# Patient Record
Sex: FEMALE | Race: WHITE
[De-identification: names, ages, dates, MRNs, and addresses within clinical notes are randomized per-mention and may not be internally consistent; named-entity substitution may affect disease eponyms.]

---

## 2017-09-13 ENCOUNTER — TRANSCRIPTION ENCOUNTER (OUTPATIENT)
Age: 42
End: 2017-09-13

## 2020-11-05 ENCOUNTER — APPOINTMENT (OUTPATIENT)
Dept: CARDIOLOGY | Facility: CLINIC | Age: 45
End: 2020-11-05

## 2020-11-17 ENCOUNTER — APPOINTMENT (OUTPATIENT)
Dept: OBGYN | Facility: CLINIC | Age: 45
End: 2020-11-17
Payer: COMMERCIAL

## 2020-11-17 VITALS
DIASTOLIC BLOOD PRESSURE: 89 MMHG | BODY MASS INDEX: 23.21 KG/M2 | WEIGHT: 131 LBS | HEART RATE: 102 BPM | HEIGHT: 63 IN | SYSTOLIC BLOOD PRESSURE: 146 MMHG

## 2020-11-17 DIAGNOSIS — N89.8 OTHER SPECIFIED NONINFLAMMATORY DISORDERS OF VAGINA: ICD-10-CM

## 2020-11-17 DIAGNOSIS — R30.0 DYSURIA: ICD-10-CM

## 2020-11-17 DIAGNOSIS — D25.9 LEIOMYOMA OF UTERUS, UNSPECIFIED: ICD-10-CM

## 2020-11-17 PROCEDURE — 99386 PREV VISIT NEW AGE 40-64: CPT

## 2020-11-17 PROCEDURE — 99214 OFFICE O/P EST MOD 30 MIN: CPT | Mod: 25

## 2020-11-17 NOTE — HISTORY OF PRESENT ILLNESS
[FreeTextEntry1] : 45-year-old white female para one presents as a new patient. She is reporting heavy menses. Has a history of having had some fibroids removed vaginally. She denies feeling lightheaded or dizzy. She says she bleed for 7 straight days.\par \par She is medical history of hypertension as well as depression/anxiety. Surgical history of left breast biopsy that was benign, appendectomy as well as a vaginal myomectomy. OB history significant for one vaginal delivery and 2 terminations. She is  but sexually active and monogamous; her partner has had a vasectomy. She denies tobacco drug use but drinks occasionally.\par \par Patient reports having had a recent mammogram. She is also reporting some dysuria today as well as a small vaginal discharge.

## 2020-11-17 NOTE — DISCUSSION/SUMMARY
[FreeTextEntry1] : Pap smear is performed. I did not detect any abnormal discharge but yeast and bacterial cultures will perform. Secondary to her dysuria a urine culture was sent as well.\par \par Regarding her heavy menses and enlarged fibroid uterus we had a long discussion. I am obtaining a CBC and a TSH. Patient will return for endometrial biopsy with office hysteroscopy and sonogram. I discussed treatment of large fibroids to be more surgical; patient says she would like to avoid hysterectomy as best as she can.

## 2020-11-19 LAB
BACTERIA UR CULT: NORMAL
HPV HIGH+LOW RISK DNA PNL CVX: NOT DETECTED

## 2020-11-21 LAB — BACTERIA GENITAL AEROBE CULT: NORMAL

## 2020-11-24 LAB — CYTOLOGY CVX/VAG DOC THIN PREP: NORMAL

## 2020-12-15 ENCOUNTER — APPOINTMENT (OUTPATIENT)
Dept: OBGYN | Facility: CLINIC | Age: 45
End: 2020-12-15
Payer: COMMERCIAL

## 2020-12-15 ENCOUNTER — ASOB RESULT (OUTPATIENT)
Age: 45
End: 2020-12-15

## 2020-12-15 VITALS
DIASTOLIC BLOOD PRESSURE: 80 MMHG | HEIGHT: 63 IN | SYSTOLIC BLOOD PRESSURE: 130 MMHG | BODY MASS INDEX: 23.04 KG/M2 | WEIGHT: 130 LBS

## 2020-12-15 PROCEDURE — 99072 ADDL SUPL MATRL&STAF TM PHE: CPT

## 2020-12-15 PROCEDURE — 58558Z: CUSTOM

## 2020-12-15 PROCEDURE — 76856 US EXAM PELVIC COMPLETE: CPT | Mod: 59

## 2020-12-15 PROCEDURE — 76830 TRANSVAGINAL US NON-OB: CPT

## 2020-12-15 NOTE — DISCUSSION/SUMMARY
[FreeTextEntry1] : I discussed the findings of the sonogram an office hysteroscopy with the patient. I discussed that her uterus is quite enlarged with multiple myomas and really the best treatment option to consider would be a hysterectomy. Patient is adamantly opposed to considering a hysterectomy. We discussed considering him with hydrothermal ablation with insertion of Mirena IUD. I discussed high risk of failure given multiple myomas. Patient however wishes to try. She is also anemic and I advised her to take iron; her H&H is 10 and 30.

## 2020-12-15 NOTE — PROCEDURE
[Endometrial Biopsy] : Endometrial biopsy [Tenaculum] : Tenaculum [Required Dilation] : required dilation [Sounded to ___ cm] : sounded to [unfilled] ~Ucm [Moderate] : moderate [Tolerated Well] : Patient tolerated the procedure well [Hysteroscopy] : Hysteroscopy [Abnormal uterine bleeding] : abnormal uterine bleeding [Risks] : risks [Benefits] : benefits [Infection] : infection [Bleeding] : bleeding [Pretreatment with misoprostol] : pretreatment with misoprostol [Sent to Pathology] : specimen was placed in buffered formalin and sent for pathology [de-identified] : no intrauterine myomas or polyps noted

## 2020-12-15 NOTE — HISTORY OF PRESENT ILLNESS
[FreeTextEntry1] : 45-year-old white female para one here for followup visit for menometrorrhagia. She presents with ultrasound and office hysteroscopy and endometrial biopsy and to discuss treatment options.\par \par Ultrasound reveals a 10. 6 x 7.8 cm uterus with multiple myomas the largest of which is a 5.5 cm intramural myoma. Endometrial lining is 9.1 mm with no polyps and both adnexa normal.

## 2020-12-22 LAB — CORE LAB BIOPSY: NORMAL

## 2021-01-13 ENCOUNTER — APPOINTMENT (OUTPATIENT)
Dept: CARDIOLOGY | Facility: CLINIC | Age: 46
End: 2021-01-13
Payer: COMMERCIAL

## 2021-01-13 ENCOUNTER — NON-APPOINTMENT (OUTPATIENT)
Age: 46
End: 2021-01-13

## 2021-01-13 VITALS
BODY MASS INDEX: 22.32 KG/M2 | OXYGEN SATURATION: 100 % | HEIGHT: 63 IN | RESPIRATION RATE: 16 BRPM | SYSTOLIC BLOOD PRESSURE: 157 MMHG | HEART RATE: 87 BPM | TEMPERATURE: 97.1 F | WEIGHT: 126 LBS | DIASTOLIC BLOOD PRESSURE: 114 MMHG

## 2021-01-13 DIAGNOSIS — R42 DIZZINESS AND GIDDINESS: ICD-10-CM

## 2021-01-13 DIAGNOSIS — R06.02 SHORTNESS OF BREATH: ICD-10-CM

## 2021-01-13 DIAGNOSIS — K21.9 GASTRO-ESOPHAGEAL REFLUX DISEASE W/OUT ESOPHAGITIS: ICD-10-CM

## 2021-01-13 DIAGNOSIS — Z87.891 PERSONAL HISTORY OF NICOTINE DEPENDENCE: ICD-10-CM

## 2021-01-13 DIAGNOSIS — Z72.89 OTHER PROBLEMS RELATED TO LIFESTYLE: ICD-10-CM

## 2021-01-13 DIAGNOSIS — R00.2 PALPITATIONS: ICD-10-CM

## 2021-01-13 DIAGNOSIS — Z82.49 FAMILY HISTORY OF ISCHEMIC HEART DISEASE AND OTHER DISEASES OF THE CIRCULATORY SYSTEM: ICD-10-CM

## 2021-01-13 DIAGNOSIS — I10 ESSENTIAL (PRIMARY) HYPERTENSION: ICD-10-CM

## 2021-01-13 PROCEDURE — 99204 OFFICE O/P NEW MOD 45 MIN: CPT

## 2021-01-13 PROCEDURE — 93000 ELECTROCARDIOGRAM COMPLETE: CPT

## 2021-01-13 PROCEDURE — 99072 ADDL SUPL MATRL&STAF TM PHE: CPT

## 2021-01-13 RX ORDER — OMEPRAZOLE 40 MG/1
40 CAPSULE, DELAYED RELEASE ORAL
Refills: 0 | Status: ACTIVE | COMMUNITY

## 2021-01-13 RX ORDER — LISINOPRIL 20 MG/1
20 TABLET ORAL DAILY
Refills: 0 | Status: ACTIVE | COMMUNITY

## 2021-01-13 RX ORDER — VENLAFAXINE HCL 37.5 MG
37.5 TABLET ORAL
Refills: 0 | Status: ACTIVE | COMMUNITY

## 2021-01-13 RX ORDER — HYDROCHLOROTHIAZIDE 12.5 MG/1
12.5 CAPSULE ORAL DAILY
Refills: 0 | Status: ACTIVE | COMMUNITY

## 2021-01-13 RX ORDER — MISOPROSTOL 200 UG/1
200 TABLET ORAL
Qty: 2 | Refills: 0 | Status: DISCONTINUED | COMMUNITY
Start: 2020-11-17 | End: 2021-01-13

## 2021-01-13 RX ORDER — BUPRENORPHINE HCL/NALOXONE HCL 8 MG-2 MG
8-2 TABLET, SUBLINGUAL SUBLINGUAL
Refills: 0 | Status: ACTIVE | COMMUNITY

## 2021-01-13 NOTE — DISCUSSION/SUMMARY
[FreeTextEntry1] : Patient is a 44yo F former smoker, anxiety, HTN, anemia from fibroids, family h/o CAD here for cardiac evaluation of palpitations, SOB and CP. Symptoms likely anxiety/panic related but will need cardiac eval. Regarding up coming surgery, may proceed from CV standpoint and arrange testing after. \par \par 1. Patient is at low cardiovascular risk for low risk surgery (D&C and IUD, hysterectomy as well if deemed necessary) \par 2. Will arrange RAFAEL, echo and holter to eval palps/CP/SOB\par 3. Consider psychiatry evaluation\par 4. Regular follow up with Dr. Jordan

## 2021-01-13 NOTE — PHYSICAL EXAM
[General Appearance - Well Developed] : well developed [General Appearance - Well Nourished] : well nourished [Normal Conjunctiva] : the conjunctiva exhibited no abnormalities [Normal Oropharynx] : normal oropharynx [Normal Jugular Venous V Waves Present] : normal jugular venous V waves present [Heart Rate And Rhythm] : heart rate and rhythm were normal [Heart Sounds] : normal S1 and S2 [Murmurs] : no murmurs present [Edema] : no peripheral edema present [] : no respiratory distress [Respiration, Rhythm And Depth] : normal respiratory rhythm and effort [Auscultation Breath Sounds / Voice Sounds] : lungs were clear to auscultation bilaterally [Abdomen Soft] : soft [Abdomen Tenderness] : non-tender [Abnormal Walk] : normal gait [Nail Clubbing] : no clubbing of the fingernails [Cyanosis, Localized] : no localized cyanosis [Skin Color & Pigmentation] : normal skin color and pigmentation [Oriented To Time, Place, And Person] : oriented to person, place, and time [Affect] : the affect was normal

## 2021-01-13 NOTE — HISTORY OF PRESENT ILLNESS
[FreeTextEntry1] : Patient is a 46yo F former smoker, anxiety, HTN, anemia here for cardiac evaluation of palpitations. HAs struggled with social anxiety for many years. HAd episode when felt nearly passed out about 6 months ago. Felt shakes, CP prior. Went to ED and work up negative. States diagnosed with panic attack.  Since that time, has remained active and professional snowboarder. However when goes up and down stairs will get SOB. Gets nauseated as well. When would try to work out, felt nauseated and vomited trying to work out. Has hard time sleeping at night as well. NO PND/orthopnea/edema. Palps occur daily. BP at home when she checks runs 130s/80s. \par \par States anemia is due to fibroids and significant bleeding with menstrual cycle. Planned for procedure next month, D&C and IUD. \par \par Lives with domestic partner, has 23yo daughter. WOrks in medical billing. \par \par ROS: GI negative, all others negative

## 2021-01-20 ENCOUNTER — OUTPATIENT (OUTPATIENT)
Dept: OUTPATIENT SERVICES | Facility: HOSPITAL | Age: 46
LOS: 1 days | End: 2021-01-20
Payer: COMMERCIAL

## 2021-01-20 DIAGNOSIS — Z01.818 ENCOUNTER FOR OTHER PREPROCEDURAL EXAMINATION: ICD-10-CM

## 2021-01-20 LAB
ANION GAP SERPL CALC-SCNC: 17 MMOL/L — SIGNIFICANT CHANGE UP (ref 5–17)
APPEARANCE UR: CLEAR — SIGNIFICANT CHANGE UP
BACTERIA # UR AUTO: ABNORMAL
BASOPHILS # BLD AUTO: 0.14 K/UL — SIGNIFICANT CHANGE UP (ref 0–0.2)
BASOPHILS NFR BLD AUTO: 1.8 % — SIGNIFICANT CHANGE UP (ref 0–2)
BILIRUB UR-MCNC: NEGATIVE — SIGNIFICANT CHANGE UP
BLD GP AB SCN SERPL QL: SIGNIFICANT CHANGE UP
BUN SERPL-MCNC: 10 MG/DL — SIGNIFICANT CHANGE UP (ref 8–20)
CALCIUM SERPL-MCNC: 9.6 MG/DL — SIGNIFICANT CHANGE UP (ref 8.6–10.2)
CHLORIDE SERPL-SCNC: 96 MMOL/L — LOW (ref 98–107)
CO2 SERPL-SCNC: 23 MMOL/L — SIGNIFICANT CHANGE UP (ref 22–29)
COLOR SPEC: YELLOW — SIGNIFICANT CHANGE UP
CREAT SERPL-MCNC: 0.51 MG/DL — SIGNIFICANT CHANGE UP (ref 0.5–1.3)
DIFF PNL FLD: NEGATIVE — SIGNIFICANT CHANGE UP
EOSINOPHIL # BLD AUTO: 0.26 K/UL — SIGNIFICANT CHANGE UP (ref 0–0.5)
EOSINOPHIL NFR BLD AUTO: 3.4 % — SIGNIFICANT CHANGE UP (ref 0–6)
EPI CELLS # UR: SIGNIFICANT CHANGE UP
GLUCOSE SERPL-MCNC: 90 MG/DL — SIGNIFICANT CHANGE UP (ref 70–99)
GLUCOSE UR QL: NEGATIVE MG/DL — SIGNIFICANT CHANGE UP
HCG SERPL-ACNC: <4 MIU/ML — SIGNIFICANT CHANGE UP
HCT VFR BLD CALC: 37.3 % — SIGNIFICANT CHANGE UP (ref 34.5–45)
HGB BLD-MCNC: 12.1 G/DL — SIGNIFICANT CHANGE UP (ref 11.5–15.5)
IMM GRANULOCYTES NFR BLD AUTO: 0.4 % — SIGNIFICANT CHANGE UP (ref 0–1.5)
KETONES UR-MCNC: ABNORMAL
LEUKOCYTE ESTERASE UR-ACNC: ABNORMAL
LYMPHOCYTES # BLD AUTO: 1.97 K/UL — SIGNIFICANT CHANGE UP (ref 1–3.3)
LYMPHOCYTES # BLD AUTO: 25.5 % — SIGNIFICANT CHANGE UP (ref 13–44)
MCHC RBC-ENTMCNC: 31.3 PG — SIGNIFICANT CHANGE UP (ref 27–34)
MCHC RBC-ENTMCNC: 32.4 GM/DL — SIGNIFICANT CHANGE UP (ref 32–36)
MCV RBC AUTO: 96.6 FL — SIGNIFICANT CHANGE UP (ref 80–100)
MONOCYTES # BLD AUTO: 0.78 K/UL — SIGNIFICANT CHANGE UP (ref 0–0.9)
MONOCYTES NFR BLD AUTO: 10.1 % — SIGNIFICANT CHANGE UP (ref 2–14)
NEUTROPHILS # BLD AUTO: 4.56 K/UL — SIGNIFICANT CHANGE UP (ref 1.8–7.4)
NEUTROPHILS NFR BLD AUTO: 58.8 % — SIGNIFICANT CHANGE UP (ref 43–77)
NITRITE UR-MCNC: NEGATIVE — SIGNIFICANT CHANGE UP
PH UR: 6 — SIGNIFICANT CHANGE UP (ref 5–8)
PLATELET # BLD AUTO: 332 K/UL — SIGNIFICANT CHANGE UP (ref 150–400)
POTASSIUM SERPL-MCNC: 4.5 MMOL/L — SIGNIFICANT CHANGE UP (ref 3.5–5.3)
POTASSIUM SERPL-SCNC: 4.5 MMOL/L — SIGNIFICANT CHANGE UP (ref 3.5–5.3)
PROT UR-MCNC: 15 MG/DL
RBC # BLD: 3.86 M/UL — SIGNIFICANT CHANGE UP (ref 3.8–5.2)
RBC # FLD: 15.7 % — HIGH (ref 10.3–14.5)
RBC CASTS # UR COMP ASSIST: SIGNIFICANT CHANGE UP /HPF (ref 0–4)
SODIUM SERPL-SCNC: 135 MMOL/L — SIGNIFICANT CHANGE UP (ref 135–145)
SP GR SPEC: 1.01 — SIGNIFICANT CHANGE UP (ref 1.01–1.02)
UROBILINOGEN FLD QL: NEGATIVE MG/DL — SIGNIFICANT CHANGE UP
WBC # BLD: 7.74 K/UL — SIGNIFICANT CHANGE UP (ref 3.8–10.5)
WBC # FLD AUTO: 7.74 K/UL — SIGNIFICANT CHANGE UP (ref 3.8–10.5)
WBC UR QL: SIGNIFICANT CHANGE UP

## 2021-01-20 PROCEDURE — 93010 ELECTROCARDIOGRAM REPORT: CPT

## 2021-01-20 PROCEDURE — 87086 URINE CULTURE/COLONY COUNT: CPT

## 2021-01-20 PROCEDURE — 93005 ELECTROCARDIOGRAM TRACING: CPT

## 2021-01-20 PROCEDURE — G0463: CPT

## 2021-01-22 LAB
CULTURE RESULTS: SIGNIFICANT CHANGE UP
SPECIMEN SOURCE: SIGNIFICANT CHANGE UP

## 2021-01-28 DIAGNOSIS — Z01.818 ENCOUNTER FOR OTHER PREPROCEDURAL EXAMINATION: ICD-10-CM

## 2021-01-29 ENCOUNTER — APPOINTMENT (OUTPATIENT)
Dept: DISASTER EMERGENCY | Facility: CLINIC | Age: 46
End: 2021-01-29

## 2021-01-30 LAB — SARS-COV-2 N GENE NPH QL NAA+PROBE: NOT DETECTED

## 2021-02-03 ENCOUNTER — RESULT REVIEW (OUTPATIENT)
Age: 46
End: 2021-02-03

## 2021-02-11 ENCOUNTER — APPOINTMENT (OUTPATIENT)
Dept: CARDIOLOGY | Facility: CLINIC | Age: 46
End: 2021-02-11

## 2021-02-23 ENCOUNTER — APPOINTMENT (OUTPATIENT)
Dept: OBGYN | Facility: CLINIC | Age: 46
End: 2021-02-23
Payer: COMMERCIAL

## 2021-02-23 VITALS
DIASTOLIC BLOOD PRESSURE: 91 MMHG | WEIGHT: 130 LBS | SYSTOLIC BLOOD PRESSURE: 138 MMHG | HEART RATE: 86 BPM | BODY MASS INDEX: 23.04 KG/M2 | HEIGHT: 63 IN

## 2021-02-23 DIAGNOSIS — N92.0 EXCESSIVE AND FREQUENT MENSTRUATION WITH REGULAR CYCLE: ICD-10-CM

## 2021-02-23 PROCEDURE — 99213 OFFICE O/P EST LOW 20 MIN: CPT

## 2021-02-23 PROCEDURE — 99072 ADDL SUPL MATRL&STAF TM PHE: CPT

## 2021-02-23 NOTE — HISTORY OF PRESENT ILLNESS
[FreeTextEntry1] : 45-year-old white female para one status post a D&C video hysteroscopy hydrothermal ablation and insertion of Mirena IUD in for follow up visit. Patient has menometrorrhagia with multiple myomas the largest of which is a 5.5 cm intramural myoma. Since the procedure patient reports persistent but scant vaginal bleeding.

## 2021-02-23 NOTE — PHYSICAL EXAM
[Labia Majora] : normal [Labia Minora] : normal [Scant] : There was scant vaginal bleeding [Normal] : normal [Enlarged ___ wks] : enlarged [unfilled] ~Uweeks [Uterine Adnexae] : normal

## 2021-02-23 NOTE — DISCUSSION/SUMMARY
[FreeTextEntry1] : Pathology was reviewed and noted to be benign. I discussed her bleeding could be secondary to just the hormonal effect a Mirena IUD. Patient was reassured; she will keep an eye on her bleeding. She understands that if she finds her bleeding intolerable we may consider a hysterectomy.

## 2021-03-03 ENCOUNTER — APPOINTMENT (OUTPATIENT)
Dept: CARDIOLOGY | Facility: CLINIC | Age: 46
End: 2021-03-03

## 2021-03-04 ENCOUNTER — APPOINTMENT (OUTPATIENT)
Dept: CARDIOLOGY | Facility: CLINIC | Age: 46
End: 2021-03-04
Payer: COMMERCIAL

## 2021-03-04 PROCEDURE — 93306 TTE W/DOPPLER COMPLETE: CPT

## 2021-03-04 PROCEDURE — 99072 ADDL SUPL MATRL&STAF TM PHE: CPT

## 2021-03-24 ENCOUNTER — APPOINTMENT (OUTPATIENT)
Dept: CARDIOLOGY | Facility: CLINIC | Age: 46
End: 2021-03-24

## 2021-03-29 ENCOUNTER — APPOINTMENT (OUTPATIENT)
Dept: CARDIOLOGY | Facility: CLINIC | Age: 46
End: 2021-03-29

## 2021-03-30 ENCOUNTER — APPOINTMENT (OUTPATIENT)
Dept: CARDIOLOGY | Facility: CLINIC | Age: 46
End: 2021-03-30

## 2021-05-13 ENCOUNTER — APPOINTMENT (OUTPATIENT)
Dept: CARDIOLOGY | Facility: CLINIC | Age: 46
End: 2021-05-13

## 2021-05-17 ENCOUNTER — APPOINTMENT (OUTPATIENT)
Dept: CARDIOLOGY | Facility: CLINIC | Age: 46
End: 2021-05-17

## 2021-12-13 ENCOUNTER — APPOINTMENT (OUTPATIENT)
Dept: OBGYN | Facility: CLINIC | Age: 46
End: 2021-12-13
Payer: COMMERCIAL

## 2021-12-13 VITALS
BODY MASS INDEX: 24.45 KG/M2 | HEIGHT: 63 IN | HEART RATE: 77 BPM | SYSTOLIC BLOOD PRESSURE: 151 MMHG | DIASTOLIC BLOOD PRESSURE: 89 MMHG | WEIGHT: 138 LBS

## 2021-12-13 DIAGNOSIS — N92.1 EXCESSIVE AND FREQUENT MENSTRUATION WITH IRREGULAR CYCLE: ICD-10-CM

## 2021-12-13 DIAGNOSIS — Z01.419 ENCOUNTER FOR GYNECOLOGICAL EXAMINATION (GENERAL) (ROUTINE) W/OUT ABNORMAL FINDINGS: ICD-10-CM

## 2021-12-13 PROCEDURE — 99213 OFFICE O/P EST LOW 20 MIN: CPT | Mod: 25

## 2021-12-13 PROCEDURE — 99396 PREV VISIT EST AGE 40-64: CPT

## 2021-12-13 NOTE — PHYSICAL EXAM

## 2021-12-15 LAB
BACTERIA UR CULT: NORMAL
HPV HIGH+LOW RISK DNA PNL CVX: NOT DETECTED

## 2021-12-20 DIAGNOSIS — B96.89 ACUTE VAGINITIS: ICD-10-CM

## 2021-12-20 DIAGNOSIS — N76.0 ACUTE VAGINITIS: ICD-10-CM

## 2021-12-20 RX ORDER — METRONIDAZOLE 7.5 MG/G
0.75 GEL VAGINAL
Qty: 1 | Refills: 1 | Status: ACTIVE | COMMUNITY
Start: 2021-12-20 | End: 1900-01-01

## 2021-12-21 LAB — CYTOLOGY CVX/VAG DOC THIN PREP: ABNORMAL

## 2022-01-05 ENCOUNTER — APPOINTMENT (OUTPATIENT)
Dept: OBGYN | Facility: CLINIC | Age: 47
End: 2022-01-05

## 2022-01-05 ENCOUNTER — APPOINTMENT (OUTPATIENT)
Dept: ANTEPARTUM | Facility: CLINIC | Age: 47
End: 2022-01-05

## 2022-01-05 NOTE — HISTORY OF PRESENT ILLNESS
[FreeTextEntry1] : 46-year-old white female para one presents for annual visit . She has a known fibroid uterus and had HTA/insertion of mirena in 2/321; pt reports significant improvement in bleeding but does report some spotting that she feels is daily.\par \par She is medical history of hypertension as well as depression/anxiety. Surgical history of left breast biopsy that was benign, appendectomy as well as a vaginal myomectomy. OB history significant for one vaginal delivery and 2 terminations. She is  but sexually active and monogamous; her partner has had a vasectomy. She denies tobacco drug use but drinks occasionally.\par \par Patient reports having had a recent mammogram. She is also reporting some dysuria today as well as a small vaginal discharge.

## 2022-01-05 NOTE — DISCUSSION/SUMMARY
[FreeTextEntry1] : Pap smear is performed. Urine culture was sent at her request.\par \par Regarding her menses and enlarged fibroid uterus we had a long discussion.As mentioned patient bleeds lightly but persistently. I discussed that if she found this intolerable we may consider hysterectomy. I discussed different forms of hysterectomy; laparoscopic versus open. Patient will return for repeat sonogram.

## 2022-05-11 ENCOUNTER — INPATIENT (INPATIENT)
Facility: HOSPITAL | Age: 47
LOS: 4 days | Discharge: HOME | DRG: 454 | End: 2022-05-16
Attending: ORTHOPAEDIC SURGERY | Admitting: ORTHOPAEDIC SURGERY
Payer: COMMERCIAL

## 2022-05-11 VITALS
HEART RATE: 77 BPM | SYSTOLIC BLOOD PRESSURE: 113 MMHG | OXYGEN SATURATION: 98 % | TEMPERATURE: 98 F | RESPIRATION RATE: 18 BRPM | DIASTOLIC BLOOD PRESSURE: 91 MMHG

## 2022-05-11 DIAGNOSIS — G95.20 UNSPECIFIED CORD COMPRESSION: ICD-10-CM

## 2022-05-11 LAB
ADD ON TEST-SPECIMEN IN LAB: SIGNIFICANT CHANGE UP
ALBUMIN SERPL ELPH-MCNC: 3.7 G/DL — SIGNIFICANT CHANGE UP (ref 3.3–5)
ALP SERPL-CCNC: 47 U/L — SIGNIFICANT CHANGE UP (ref 40–120)
ALT FLD-CCNC: 41 U/L — SIGNIFICANT CHANGE UP (ref 12–78)
ANION GAP SERPL CALC-SCNC: 8 MMOL/L — SIGNIFICANT CHANGE UP (ref 5–17)
APTT BLD: 31.3 SEC — SIGNIFICANT CHANGE UP (ref 27.5–35.5)
AST SERPL-CCNC: 48 U/L — HIGH (ref 15–37)
BASOPHILS # BLD AUTO: 0.08 K/UL — SIGNIFICANT CHANGE UP (ref 0–0.2)
BASOPHILS NFR BLD AUTO: 1.3 % — SIGNIFICANT CHANGE UP (ref 0–2)
BILIRUB SERPL-MCNC: 0.4 MG/DL — SIGNIFICANT CHANGE UP (ref 0.2–1.2)
BUN SERPL-MCNC: 6 MG/DL — LOW (ref 7–23)
CALCIUM SERPL-MCNC: 9.1 MG/DL — SIGNIFICANT CHANGE UP (ref 8.5–10.1)
CHLORIDE SERPL-SCNC: 101 MMOL/L — SIGNIFICANT CHANGE UP (ref 96–108)
CO2 SERPL-SCNC: 27 MMOL/L — SIGNIFICANT CHANGE UP (ref 22–31)
CREAT SERPL-MCNC: 0.57 MG/DL — SIGNIFICANT CHANGE UP (ref 0.5–1.3)
EGFR: 113 ML/MIN/1.73M2 — SIGNIFICANT CHANGE UP
EOSINOPHIL # BLD AUTO: 0.04 K/UL — SIGNIFICANT CHANGE UP (ref 0–0.5)
EOSINOPHIL NFR BLD AUTO: 0.6 % — SIGNIFICANT CHANGE UP (ref 0–6)
GLUCOSE SERPL-MCNC: 85 MG/DL — SIGNIFICANT CHANGE UP (ref 70–99)
HCT VFR BLD CALC: 34.8 % — SIGNIFICANT CHANGE UP (ref 34.5–45)
HGB BLD-MCNC: 11.4 G/DL — LOW (ref 11.5–15.5)
IMM GRANULOCYTES NFR BLD AUTO: 0.2 % — SIGNIFICANT CHANGE UP (ref 0–1.5)
INR BLD: 0.93 RATIO — SIGNIFICANT CHANGE UP (ref 0.88–1.16)
LYMPHOCYTES # BLD AUTO: 1.85 K/UL — SIGNIFICANT CHANGE UP (ref 1–3.3)
LYMPHOCYTES # BLD AUTO: 29.4 % — SIGNIFICANT CHANGE UP (ref 13–44)
MCHC RBC-ENTMCNC: 32.8 GM/DL — SIGNIFICANT CHANGE UP (ref 32–36)
MCHC RBC-ENTMCNC: 33.2 PG — SIGNIFICANT CHANGE UP (ref 27–34)
MCV RBC AUTO: 101.5 FL — HIGH (ref 80–100)
MONOCYTES # BLD AUTO: 0.51 K/UL — SIGNIFICANT CHANGE UP (ref 0–0.9)
MONOCYTES NFR BLD AUTO: 8.1 % — SIGNIFICANT CHANGE UP (ref 2–14)
NEUTROPHILS # BLD AUTO: 3.8 K/UL — SIGNIFICANT CHANGE UP (ref 1.8–7.4)
NEUTROPHILS NFR BLD AUTO: 60.4 % — SIGNIFICANT CHANGE UP (ref 43–77)
PLATELET # BLD AUTO: 307 K/UL — SIGNIFICANT CHANGE UP (ref 150–400)
POTASSIUM SERPL-MCNC: 3.8 MMOL/L — SIGNIFICANT CHANGE UP (ref 3.5–5.3)
POTASSIUM SERPL-SCNC: 3.8 MMOL/L — SIGNIFICANT CHANGE UP (ref 3.5–5.3)
PROT SERPL-MCNC: 7.6 GM/DL — SIGNIFICANT CHANGE UP (ref 6–8.3)
PROTHROM AB SERPL-ACNC: 10.8 SEC — SIGNIFICANT CHANGE UP (ref 10.5–13.4)
RBC # BLD: 3.43 M/UL — LOW (ref 3.8–5.2)
RBC # FLD: 14.6 % — HIGH (ref 10.3–14.5)
SARS-COV-2 RNA SPEC QL NAA+PROBE: SIGNIFICANT CHANGE UP
SODIUM SERPL-SCNC: 136 MMOL/L — SIGNIFICANT CHANGE UP (ref 135–145)
WBC # BLD: 6.29 K/UL — SIGNIFICANT CHANGE UP (ref 3.8–10.5)
WBC # FLD AUTO: 6.29 K/UL — SIGNIFICANT CHANGE UP (ref 3.8–10.5)

## 2022-05-11 PROCEDURE — 80048 BASIC METABOLIC PNL TOTAL CA: CPT

## 2022-05-11 PROCEDURE — 82962 GLUCOSE BLOOD TEST: CPT

## 2022-05-11 PROCEDURE — 81001 URINALYSIS AUTO W/SCOPE: CPT

## 2022-05-11 PROCEDURE — 93970 EXTREMITY STUDY: CPT | Mod: 26

## 2022-05-11 PROCEDURE — 97116 GAIT TRAINING THERAPY: CPT | Mod: GP

## 2022-05-11 PROCEDURE — 93010 ELECTROCARDIOGRAM REPORT: CPT

## 2022-05-11 PROCEDURE — 87040 BLOOD CULTURE FOR BACTERIA: CPT

## 2022-05-11 PROCEDURE — 72131 CT LUMBAR SPINE W/O DYE: CPT | Mod: 26,MA

## 2022-05-11 PROCEDURE — 93970 EXTREMITY STUDY: CPT

## 2022-05-11 PROCEDURE — 72158 MRI LUMBAR SPINE W/O & W/DYE: CPT | Mod: 26

## 2022-05-11 PROCEDURE — 97162 PT EVAL MOD COMPLEX 30 MIN: CPT | Mod: GP

## 2022-05-11 PROCEDURE — 86140 C-REACTIVE PROTEIN: CPT

## 2022-05-11 PROCEDURE — 87102 FUNGUS ISOLATION CULTURE: CPT

## 2022-05-11 PROCEDURE — 87070 CULTURE OTHR SPECIMN AEROBIC: CPT

## 2022-05-11 PROCEDURE — 85652 RBC SED RATE AUTOMATED: CPT

## 2022-05-11 PROCEDURE — 85027 COMPLETE CBC AUTOMATED: CPT

## 2022-05-11 PROCEDURE — 87086 URINE CULTURE/COLONY COUNT: CPT

## 2022-05-11 PROCEDURE — 86901 BLOOD TYPING SEROLOGIC RH(D): CPT

## 2022-05-11 PROCEDURE — 87075 CULTR BACTERIA EXCEPT BLOOD: CPT

## 2022-05-11 PROCEDURE — C1889: CPT

## 2022-05-11 PROCEDURE — 88304 TISSUE EXAM BY PATHOLOGIST: CPT

## 2022-05-11 PROCEDURE — 74176 CT ABD & PELVIS W/O CONTRAST: CPT

## 2022-05-11 PROCEDURE — 97530 THERAPEUTIC ACTIVITIES: CPT | Mod: GP

## 2022-05-11 PROCEDURE — 86891 AUTOLOGOUS BLOOD OP SALVAGE: CPT

## 2022-05-11 PROCEDURE — 93971 EXTREMITY STUDY: CPT | Mod: RT

## 2022-05-11 PROCEDURE — A9579: CPT

## 2022-05-11 PROCEDURE — 99221 1ST HOSP IP/OBS SF/LOW 40: CPT

## 2022-05-11 PROCEDURE — C1713: CPT

## 2022-05-11 PROCEDURE — 85730 THROMBOPLASTIN TIME PARTIAL: CPT

## 2022-05-11 PROCEDURE — 81025 URINE PREGNANCY TEST: CPT

## 2022-05-11 PROCEDURE — 99285 EMERGENCY DEPT VISIT HI MDM: CPT

## 2022-05-11 PROCEDURE — 86900 BLOOD TYPING SEROLOGIC ABO: CPT

## 2022-05-11 PROCEDURE — 71045 X-RAY EXAM CHEST 1 VIEW: CPT | Mod: 26

## 2022-05-11 PROCEDURE — 72158 MRI LUMBAR SPINE W/O & W/DYE: CPT | Mod: MA

## 2022-05-11 PROCEDURE — 82746 ASSAY OF FOLIC ACID SERUM: CPT

## 2022-05-11 PROCEDURE — 36415 COLL VENOUS BLD VENIPUNCTURE: CPT

## 2022-05-11 PROCEDURE — 76000 FLUOROSCOPY <1 HR PHYS/QHP: CPT

## 2022-05-11 PROCEDURE — 83735 ASSAY OF MAGNESIUM: CPT

## 2022-05-11 PROCEDURE — 85025 COMPLETE CBC W/AUTO DIFF WBC: CPT

## 2022-05-11 PROCEDURE — 71045 X-RAY EXAM CHEST 1 VIEW: CPT

## 2022-05-11 PROCEDURE — 86850 RBC ANTIBODY SCREEN: CPT

## 2022-05-11 PROCEDURE — 82607 VITAMIN B-12: CPT

## 2022-05-11 PROCEDURE — 85610 PROTHROMBIN TIME: CPT

## 2022-05-11 RX ORDER — LISINOPRIL/HYDROCHLOROTHIAZIDE 10-12.5 MG
1 TABLET ORAL
Qty: 0 | Refills: 0 | DISCHARGE

## 2022-05-11 RX ORDER — TRAMADOL HYDROCHLORIDE 50 MG/1
50 TABLET ORAL EVERY 6 HOURS
Refills: 0 | Status: DISCONTINUED | OUTPATIENT
Start: 2022-05-11 | End: 2022-05-12

## 2022-05-11 RX ORDER — VENLAFAXINE HCL 75 MG
1 CAPSULE, EXT RELEASE 24 HR ORAL
Qty: 0 | Refills: 0 | DISCHARGE

## 2022-05-11 RX ORDER — BACITRACIN ZINC NEOMYCIN SULFATE POLYMYXIN B SULFATE 400; 3.5; 5 [IU]/G; MG/G; [IU]/G
1 OINTMENT TOPICAL
Qty: 0 | Refills: 0 | DISCHARGE

## 2022-05-11 RX ORDER — OXYCODONE HYDROCHLORIDE 5 MG/1
10 TABLET ORAL EVERY 6 HOURS
Refills: 0 | Status: DISCONTINUED | OUTPATIENT
Start: 2022-05-11 | End: 2022-05-12

## 2022-05-11 RX ORDER — OMEPRAZOLE 10 MG/1
1 CAPSULE, DELAYED RELEASE ORAL
Qty: 0 | Refills: 0 | DISCHARGE

## 2022-05-11 RX ORDER — ONDANSETRON 8 MG/1
1 TABLET, FILM COATED ORAL
Qty: 0 | Refills: 0 | DISCHARGE

## 2022-05-11 RX ORDER — SODIUM CHLORIDE 9 MG/ML
1000 INJECTION, SOLUTION INTRAVENOUS
Refills: 0 | Status: DISCONTINUED | OUTPATIENT
Start: 2022-05-11 | End: 2022-05-16

## 2022-05-11 RX ORDER — HYDROMORPHONE HYDROCHLORIDE 2 MG/ML
1 INJECTION INTRAMUSCULAR; INTRAVENOUS; SUBCUTANEOUS ONCE
Refills: 0 | Status: DISCONTINUED | OUTPATIENT
Start: 2022-05-11 | End: 2022-05-11

## 2022-05-11 RX ORDER — METOPROLOL TARTRATE 50 MG
50 TABLET ORAL DAILY
Refills: 0 | Status: DISCONTINUED | OUTPATIENT
Start: 2022-05-11 | End: 2022-05-11

## 2022-05-11 RX ORDER — OXYCODONE HYDROCHLORIDE 5 MG/1
5 TABLET ORAL EVERY 6 HOURS
Refills: 0 | Status: DISCONTINUED | OUTPATIENT
Start: 2022-05-11 | End: 2022-05-12

## 2022-05-11 RX ORDER — OMEPRAZOLE 10 MG/1
40 CAPSULE, DELAYED RELEASE ORAL
Qty: 0 | Refills: 0 | DISCHARGE

## 2022-05-11 RX ORDER — METOPROLOL TARTRATE 50 MG
1 TABLET ORAL
Qty: 0 | Refills: 0 | DISCHARGE

## 2022-05-11 RX ORDER — PANTOPRAZOLE SODIUM 20 MG/1
40 TABLET, DELAYED RELEASE ORAL DAILY
Refills: 0 | Status: DISCONTINUED | OUTPATIENT
Start: 2022-05-11 | End: 2022-05-16

## 2022-05-11 RX ORDER — HYDROCHLOROTHIAZIDE 25 MG
12.5 TABLET ORAL DAILY
Refills: 0 | Status: DISCONTINUED | OUTPATIENT
Start: 2022-05-11 | End: 2022-05-11

## 2022-05-11 RX ORDER — VENLAFAXINE HCL 75 MG
75 CAPSULE, EXT RELEASE 24 HR ORAL DAILY
Refills: 0 | Status: DISCONTINUED | OUTPATIENT
Start: 2022-05-11 | End: 2022-05-16

## 2022-05-11 RX ORDER — LISINOPRIL 2.5 MG/1
20 TABLET ORAL DAILY
Refills: 0 | Status: DISCONTINUED | OUTPATIENT
Start: 2022-05-11 | End: 2022-05-16

## 2022-05-11 RX ORDER — METOPROLOL TARTRATE 50 MG
50 TABLET ORAL DAILY
Refills: 0 | Status: DISCONTINUED | OUTPATIENT
Start: 2022-05-11 | End: 2022-05-16

## 2022-05-11 RX ORDER — HYDROMORPHONE HYDROCHLORIDE 2 MG/ML
0.5 INJECTION INTRAMUSCULAR; INTRAVENOUS; SUBCUTANEOUS EVERY 4 HOURS
Refills: 0 | Status: DISCONTINUED | OUTPATIENT
Start: 2022-05-11 | End: 2022-05-12

## 2022-05-11 RX ORDER — VENLAFAXINE HCL 75 MG
75 CAPSULE, EXT RELEASE 24 HR ORAL DAILY
Refills: 0 | Status: DISCONTINUED | OUTPATIENT
Start: 2022-05-11 | End: 2022-05-11

## 2022-05-11 RX ADMIN — OXYCODONE HYDROCHLORIDE 5 MILLIGRAM(S): 5 TABLET ORAL at 12:54

## 2022-05-11 RX ADMIN — OXYCODONE HYDROCHLORIDE 5 MILLIGRAM(S): 5 TABLET ORAL at 23:36

## 2022-05-11 RX ADMIN — HYDROMORPHONE HYDROCHLORIDE 1 MILLIGRAM(S): 2 INJECTION INTRAMUSCULAR; INTRAVENOUS; SUBCUTANEOUS at 14:14

## 2022-05-11 RX ADMIN — OXYCODONE HYDROCHLORIDE 10 MILLIGRAM(S): 5 TABLET ORAL at 20:08

## 2022-05-11 RX ADMIN — HYDROMORPHONE HYDROCHLORIDE 0.5 MILLIGRAM(S): 2 INJECTION INTRAMUSCULAR; INTRAVENOUS; SUBCUTANEOUS at 21:37

## 2022-05-11 RX ADMIN — HYDROMORPHONE HYDROCHLORIDE 1 MILLIGRAM(S): 2 INJECTION INTRAMUSCULAR; INTRAVENOUS; SUBCUTANEOUS at 17:21

## 2022-05-11 RX ADMIN — OXYCODONE HYDROCHLORIDE 10 MILLIGRAM(S): 5 TABLET ORAL at 19:38

## 2022-05-11 RX ADMIN — HYDROMORPHONE HYDROCHLORIDE 0.5 MILLIGRAM(S): 2 INJECTION INTRAMUSCULAR; INTRAVENOUS; SUBCUTANEOUS at 21:07

## 2022-05-11 NOTE — H&P ADULT - NSICDXPASTMEDICALHX_GEN_ALL_CORE_FT
PAST MEDICAL HISTORY:  Anxiety     Anxiety and depression     GERD (gastroesophageal reflux disease)     Hypertension       Fibroids

## 2022-05-11 NOTE — ED STATDOCS - OBJECTIVE STATEMENT
45 y/o female with a PMHx of fibroids presents to the ED c/o back pain radiating to LE x weeks. Pt s/p lumbar discectomy on 4/4/22. Pt had a second MRI 2 weeks ago. Pt advised to come to the ED for evaluation.  Pt has been taking Dilaudid at home without improvement. +incontinence. Denies fevers. NKDA. No other complaints at this time.

## 2022-05-11 NOTE — ED ADULT NURSE NOTE - OBJECTIVE STATEMENT
Patient presents to the emergency room with complaints of lower back pain. Patient alert and oriented, complains of back pain radiating down left leg with numbness and tingling. Patient ambulatory. Patient states she has had episodes of bowel and urine incontinence. Patient denies shortness of breath, fever, chills, N/V/D.

## 2022-05-11 NOTE — PROGRESS NOTE ADULT - SUBJECTIVE AND OBJECTIVE BOX
Pt admitted with increased lumbar pain and B/L LE pain.  No gladys loss of bowel, bladder function.  She is 6 weeks post op from L5/S1 laminectomies.  Pt was slightly improved post op now pain has returned.  I have discussed the patient and images with the Orthopedic team.  I have also had a long conversation with the patient and her  Ravindra  We have reviewed the CT and MRI.  Recurrent disc herniation with post op fluid collection, disk ht loss at L5/S1.    Low suspiscion for infection as she has a normal wound, no fevers, normal; WBC.    Options once again reviewed with the patient.  Non op options include observation, PT Pain management  Surgery would involve revision decompression and fusion of the L5/S1 level.  The risks, benefits of surgery were discussed in detail . The surgery described in detail as well.  Surgery would be a L5/S1 revision laminectomies, diskectomy, interbody fusion, pedicle screw instrumentation, allograft, BMP.  She understands the surgery does not guarantee resolution of the pain and can lead to increased pain, infection, bleeding, nerve injury, paralysis, hardware issues, non union, wound issues, nerve compression, spinal fluid leak, further surgery and medical risks.     They understand our discussion and have elected to move forward with the surgery.

## 2022-05-11 NOTE — ED STATDOCS - NS ED ATTENDING STATEMENT MOD
This was a shared visit with the RAHUL. I reviewed and verified the documentation and independently performed the documented:

## 2022-05-11 NOTE — ED ADULT NURSE NOTE - CHIEF COMPLAINT QUOTE
c/o back and leg for past 5 weeks, s/p lumbar discectomy 6 weeks ago, sent in by dr. Styles for possible admit [phone] ( requesting call), have been taking dilaudid with no improvement in pain, denies fever, c/o mild incisional area redness and swelling HX: denies

## 2022-05-11 NOTE — H&P ADULT - NSHPREVIEWOFSYSTEMS_GEN_ALL_CORE
General: denies fever, chills, weight gain or weight loss, changes in appetite  HEENT: denies nasal congestion, cough, rhinorrhea, sore throat, headache, changes in vision  Cardio: denies palpitations, pallor, chest pain or discomfort  Pulm: denies shortness of breath  GI: denies nausea, vomiting, abdominal pain, constipation   /Renal: denies dysuria, foul smelling urine, increased frequency, flank pain  Endo: denies temperature intolerance  Heme: denies bruising or abnormal bleeding  Skin: denies rash

## 2022-05-11 NOTE — ED ADULT NURSE NOTE - NSIMPLEMENTINTERV_GEN_ALL_ED
Implemented All Fall Risk Interventions:  [place] to call system. Call bell, personal items and telephone within reach. Instruct patient to call for assistance. Room bathroom lighting operational. Non-slip footwear when patient is off stretcher. Physically safe environment: no spills, clutter or unnecessary equipment. Stretcher in lowest position, wheels locked, appropriate side rails in place. Provide visual cue, wrist band, yellow gown, etc. Monitor gait and stability. Monitor for mental status changes and reorient to person, place, and time. Review medications for side effects contributing to fall risk. Reinforce activity limits and safety measures with patient and family.

## 2022-05-11 NOTE — H&P ADULT - HISTORY OF PRESENT ILLNESS
Patient is a 46yFemale community ambulator without assistive devices who presents to  ED w/ a c/o of Low back pain. Patient states she had L5-S1 Laminectomy with Dr. Martinez 4/22 however has had continued low back pain since surgery. Denies fall, trauma HS/LOC. Localizing pain to lumbar spine. She reports burning radiation of pain into left buttock, leg and top of left foot since surgery however has more recently began to radiate into the right buttock and leg as well. States continued ability to ambulate since surgery without assistance. Endorse leg weakness and occasional buckling. Patient also endorses several episodes of bowel incontinence since the end of March. Patient states the most recent episode occurred 2 days ago however she has otherwise had normal control over her bowel and bladder.  Denies having any other pain elsewhere. Denies other orthopaedic history. No other orthopaedic concerns at this time.         PAST MEDICAL & SURGICAL HISTORY:      Vital Signs Last 24 Hrs  T(C): 36.7 (11 May 2022 11:16), Max: 36.7 (11 May 2022 11:16)  T(F): 98.1 (11 May 2022 11:16), Max: 98.1 (11 May 2022 11:16)  HR: 77 (11 May 2022 11:16) (77 - 77)  BP: 113/91 (11 May 2022 11:16) (113/91 - 113/91)  BP(mean): 98 (11 May 2022 11:16) (98 - 98)  RR: 18 (11 May 2022 11:16) (18 - 18)  SpO2: 98% (11 May 2022 11:16) (98% - 98%)  I&O's Detail      LABS:                        11.4   6.29  )-----------( 307      ( 11 May 2022 12:16 )             34.8     05-11    136  |  101  |  6<L>  ----------------------------<  85  3.8   |  27  |  0.57    Ca    9.1      11 May 2022 12:16    TPro  7.6  /  Alb  3.7  /  TBili  0.4  /  DBili  x   /  AST  48<H>  /  ALT  41  /  AlkPhos  47  05-11    PT/INR - ( 11 May 2022 12:16 )   PT: 10.8 sec;   INR: 0.93 ratio         PTT - ( 11 May 2022 12:16 )  PTT:31.3 sec      PHYSICAL EXAM:  General; Awake and alert, Oriented x 3  Spine:  TTP over spinous processes or paraspinal muscles at T/L spine. No palpable step off.     Able to actively SLR BL. +pain to passive SLR BL  Ambulating w/o pain     + Passive with decreased Active rectal tone   No saddle anesthesia         Negative Finger Escape Test   Negative -release Test            Motor exam:        Elbow Flex (C5)      Wrist Ext (C6)       Elbow Ext (C7)       Finger Flex (C8)       Finger ABd (T1)   R           5/5                           5/5                         5/5                           5/5                            5/5  L           5/5                            5/5                         5/5                           5/5                           5/5         Hip Flex (L2)        Knee Ext (L3)       Ankle Dorsi (L4)       Great Toe Ext (L5)      Ankle Plantar (S1)  R        5/5                          4/5                        5/5                              5/5                               5/5  L         5/5                          5/5                        5/5                              5/5                              5/5    Sensory:  (0=absent, 1=impaired, 2=normal, NT=not testable)      C5    C6   C7   C8   T1         R   2     2      2     2      2  L    2     2      2     2      2        L2    L3   L4   L5   S1   R   2     2     2     2     2  L    2     2     2     2     2    Right Upper extremity:   Negative Jewell  +Rad Pulse  Compartments soft and compressible    Left Upper extremity:   Negative Jewell  +Rad Pulse  Compartments soft and compressible    Right Lower Extremity:   SILT L2-S1  Negative Clonus  Negative Babinski  +DP  Compartments soft and compressible           Left Lower Extremity:  SILT L2-S1  Negative Clonus   Negative Babinski  +DP  Compartments soft and compressible    Secondary  Skin intact. No erythema/ecchymosis. No TTP over bony prominences, SILT, palpable pulses, full/painless A/PROM, compartments soft. No other injuries or complaints. Negative logroll/heelstrike BL.     Imaging: CT L spine  1.Status post laminectomy. Narrowing of the intervertebral disc space with endplate degenerative changes and a vacuum disc phenomena.There does appear to be residual bulging of disc material which impresses on ventral thecal sac. If there is clinical concern for residual post surgical scar tissue or residual/recurrent herniation of the nucleus pulposus, recommend follow-up pre and postcontrast MR imaging of the lumbar spine. Please refer to recently obtained outside MR imaging prior to further MRI assessment.  2. Additional lumbar spine degenerative changes, as described level by level in detail above.  3. No acute fracture or AP plane subluxation.                                            A/P :   Patient is a 46yFemale w/ suspected reherniation of L5-S1 Disc sp L5-S1 laminectomy    -Patient reports some bowel incontinence with slightly decreased active rectal tone however she demonstrates no other correlating signs/symptoms consistent w/ acute cord compression/cauda equina. Does not demonstrate red flag symptoms such as bladder retention/incontinence, saddle anesthesia, fevers/chills, or weight loss. Therefore suspicion for cauda equina is low.   -FU MRI to further evaluate lumbar pathology  -If significant MRI findings, Plan for surgical intervention for revision laminectomy with L5-S1 fusion, will book and begin preop.  -Preop labs/imaging: CBC/BMP/PT/PTT/INR/T&S/Covid/CXR/EKG.  -NPO after midnight/IVFs while NPO.  -WBAT  -PT/OT/OOB as tolerated  -DVT ppx  -Pain control prn  -Medical management, continue home meds. Please document medical clearance for surgery.  -Case discussed with Dr. Martinez, will advise if plan changes.

## 2022-05-11 NOTE — ED STATDOCS - PROGRESS NOTE DETAILS
PA: Patient is a 47 y/o female with PMHx of fibroids who presents to [facility] c/o back pain radiating to LE x2 weeks. Pt is s/p lumbar discectomy on 4/4/22. Pt had a second MRI 2 weeks ago. Pt was advised to come to ED to r/o spinal cord compression. Pt has been taking Dilaudid at home without improvement. +incontinence. Denies fevers. NKDA. No other complaints at this time. ~Markus Barrow PA-C Patient seen by NeuroSx team, ordered MRI, will admit patient to dr. Martinez. ~Markus Barrow PA-C

## 2022-05-11 NOTE — CONSULT NOTE ADULT - SUBJECTIVE AND OBJECTIVE BOX
c/c: back pain with radiation down LLE with paresthesias    HPI: 46F, pmh of HTN, Anxiety, palpitations, Depression, GERD, Fibroids, anemia presented to the ER with worsening back pain. She has had chronic back pain X 20 years. Recently in march, was in [place] where all of a sudden she started having severe lower back pain with radiation down LLE with numbness and tingling down LLE. No weakness. She underwent a lumbar laminectomy at [facility] on 4/4/2022. Stayed in the hospital for about 2- 3 days then was discharged home after an uncomplicated stay. She subsequently noticed worsening pain similar to the pain she had prior to surgery. She underwent outpt imaging which she states showed disc fragments affecting the S1 nerve.   She is now admitted for further mx. Hospitalist service consulted for medical comanagement. She does report urinary incontinence. Has intermittent constipation and diarrhea.   She denies any sob/chest pains. no n/v. Taking dilaudid for pain. No prior reactions to anesthesia. Had 2Decho 1-2 years ago which she states was normal. never had or required a stress test. No recent f/c/r. NO cough/sputum.     ROS: all 10 systems reviewed and is as above otherwise negative.     PMH: as above  PSH: appendectomy, breast biopsy, D+C  F/H: father-MI in his 60s.  Social: Tobacco-1/2 ppDX15 years quit 6 years ago, Social ETOH  Allergies: NKDA  Home meds: see med rec.     Vital Signs Last 24 Hrs  T(C): 36.7 (11 May 2022 11:16), Max: 36.7 (11 May 2022 11:16)  T(F): 98.1 (11 May 2022 11:16), Max: 98.1 (11 May 2022 11:16)  HR: 77 (11 May 2022 11:16) (77 - 77)  BP: 113/91 (11 May 2022 11:16) (113/91 - 113/91)  BP(mean): 98 (11 May 2022 11:16) (98 - 98)  RR: 18 (11 May 2022 11:16) (18 - 18)  SpO2: 98% (11 May 2022 11:16) (98% - 98%)    PHYSICAL EXAM:    GENERAL: Comfortable, no acute distress   HEAD:  Normocephalic, atraumatic  EYES: EOMI, PERRLA  HEENT: Moist mucous membranes  NECK: Supple, No JVD  NERVOUS SYSTEM:  Alert & Oriented X3, Motor Strength 5/5 B/L upper and lower extremities  CHEST/LUNG: Clear to auscultation bilaterally  HEART: Regular rate and rhythm  ABDOMEN: Soft, Nontender, Nondistended, Bowel sounds present  GENITOURINARY: Voiding, no palpable bladder  EXTREMITIES:   No clubbing, cyanosis, or edema  MUSCULOSKELETAL- SLR LLE+, incision site lower back without drainage/erythema  SKIN-no rash    LABS:                        11.4   6.29  )-----------( 307      ( 11 May 2022 12:16 )             34.8     05-11    136  |  101  |  6<L>  ----------------------------<  85  3.8   |  27  |  0.57    Ca    9.1      11 May 2022 12:16    TPro  7.6  /  Alb  3.7  /  TBili  0.4  /  DBili  x   /  AST  48<H>  /  ALT  41  /  AlkPhos  47  05-11    PT/INR - ( 11 May 2022 12:16 )   PT: 10.8 sec;   INR: 0.93 ratio    PTT - ( 11 May 2022 12:16 )  PTT:31.3 sec     Xray Chest 1 View- PORTABLE-Urgent (Xray Chest 1 View- PORTABLE-Urgent .) (05.11.22 @ 12:33) >  IMPRESSION: No evidence for focal infiltrate or lobar consolidation.   CT Lumbar Spine No Cont (05.11.22 @ 13:02) >  IMPRESSION:  1.Status post laminectomy. Narrowing of the intervertebral disc space   with endplate degenerative changes and a vacuum disc phenomena.There does   appear to be residual bulging of disc material which impresses on ventral   thecal sac. If there is clinical concern for residual post surgical scar   tissue or residual/recurrent herniation of the nucleus pulposus,   recommend follow-up pre and postcontrast MR imaging of the lumbar spine.   Please refer to recently obtained outside MR imaging prior to further MRI   assessment.  2. Additional lumbar spine degenerative changes, as described level by   level in detail above.  3. No acute fracture or AP plane subluxation.    EKG: sinus, non specific t wave changes    MEDICATIONS  (STANDING):  lactated ringers. 1000 milliLiter(s) (75 mL/Hr) IV Continuous <Continuous>    MEDICATIONS  (PRN):  oxyCODONE    IR 5 milliGRAM(s) Oral every 6 hours PRN Moderate Pain (4 - 6)  oxyCODONE    IR 10 milliGRAM(s) Oral every 6 hours PRN Severe Pain (7 - 10)  traMADol 50 milliGRAM(s) Oral every 6 hours PRN Mild Pain (1 - 3)    ASSESSMENT AND PLAN:  46F, pmh as above a/w    1. Recurrence of back pain/parasthesia post lumbar laminectomy-  -admitted to spine.   -CT with residual bulging of disc material which compresses ventral thecal sac.   -f/u MRI   -NO MEDICAL CONTRAINDICATIONS FOR OR AT THIS TIME.   -pain control  -incentive spirometry  -physical therapy post op.    2. HTN:  -continue ace-i/bb with hold parameters.   -hold hctz.     3. Macrocytic anemia:  -check b12/folate.     4. Anxiety/depression:  -effexor, prn ativan.     5. Palpitations:  -bb    5. DVT px:  -on hold for OR.     Thank you, will follow.            c/c: back pain with radiation down LLE with paresthesias    HPI: 46F, pmh of HTN, Anxiety, palpitations, Depression, GERD, Fibroids, anemia presented to the ER with worsening back pain. She has had chronic back pain X 20 years. Recently in march, was in [place] where all of a sudden she started having severe lower back pain with radiation down LLE with numbness and tingling down LLE. No weakness. She underwent a lumbar laminectomy at [facility] on 4/4/2022. Stayed in the hospital for about 2- 3 days then was discharged home after an uncomplicated stay. She subsequently noticed worsening pain similar to the pain she had prior to surgery. She underwent outpt imaging which she states showed disc fragments affecting the S1 nerve.   She is now admitted for further mx. Hospitalist service consulted for medical comanagement.  Has intermittent constipation and diarrhea.   She denies any sob/chest pains. no n/v. Taking dilaudid for pain. No prior reactions to anesthesia. Had 2Decho 1-2 years ago which she states was normal. never had or required a stress test. No recent f/c/r. NO cough/sputum.     ROS: all 10 systems reviewed and is as above otherwise negative.     PMH: as above  PSH: appendectomy, breast biopsy, D+C  F/H: father-MI in his 60s.  Social: Tobacco-1/2 ppDX15 years quit 6 years ago, Social ETOH  Allergies: NKDA  Home meds: see med rec.     Vital Signs Last 24 Hrs  T(C): 36.7 (11 May 2022 11:16), Max: 36.7 (11 May 2022 11:16)  T(F): 98.1 (11 May 2022 11:16), Max: 98.1 (11 May 2022 11:16)  HR: 77 (11 May 2022 11:16) (77 - 77)  BP: 113/91 (11 May 2022 11:16) (113/91 - 113/91)  BP(mean): 98 (11 May 2022 11:16) (98 - 98)  RR: 18 (11 May 2022 11:16) (18 - 18)  SpO2: 98% (11 May 2022 11:16) (98% - 98%)    PHYSICAL EXAM:    GENERAL: Comfortable, no acute distress   HEAD:  Normocephalic, atraumatic  EYES: EOMI, PERRLA  HEENT: Moist mucous membranes  NECK: Supple, No JVD  NERVOUS SYSTEM:  Alert & Oriented X3, Motor Strength 5/5 B/L upper and lower extremities  CHEST/LUNG: Clear to auscultation bilaterally  HEART: Regular rate and rhythm  ABDOMEN: Soft, Nontender, Nondistended, Bowel sounds present  GENITOURINARY: Voiding, no palpable bladder  EXTREMITIES:   No clubbing, cyanosis, or edema  MUSCULOSKELETAL- SLR LLE+, incision site lower back without drainage/erythema  SKIN-no rash    LABS:                        11.4   6.29  )-----------( 307      ( 11 May 2022 12:16 )             34.8     05-11    136  |  101  |  6<L>  ----------------------------<  85  3.8   |  27  |  0.57    Ca    9.1      11 May 2022 12:16    TPro  7.6  /  Alb  3.7  /  TBili  0.4  /  DBili  x   /  AST  48<H>  /  ALT  41  /  AlkPhos  47  05-11    PT/INR - ( 11 May 2022 12:16 )   PT: 10.8 sec;   INR: 0.93 ratio    PTT - ( 11 May 2022 12:16 )  PTT:31.3 sec     Xray Chest 1 View- PORTABLE-Urgent (Xray Chest 1 View- PORTABLE-Urgent .) (05.11.22 @ 12:33) >  IMPRESSION: No evidence for focal infiltrate or lobar consolidation.   CT Lumbar Spine No Cont (05.11.22 @ 13:02) >  IMPRESSION:  1.Status post laminectomy. Narrowing of the intervertebral disc space   with endplate degenerative changes and a vacuum disc phenomena.There does   appear to be residual bulging of disc material which impresses on ventral   thecal sac. If there is clinical concern for residual post surgical scar   tissue or residual/recurrent herniation of the nucleus pulposus,   recommend follow-up pre and postcontrast MR imaging of the lumbar spine.   Please refer to recently obtained outside MR imaging prior to further MRI   assessment.  2. Additional lumbar spine degenerative changes, as described level by   level in detail above.  3. No acute fracture or AP plane subluxation.    EKG: sinus, non specific t wave changes    MEDICATIONS  (STANDING):  lactated ringers. 1000 milliLiter(s) (75 mL/Hr) IV Continuous <Continuous>    MEDICATIONS  (PRN):  oxyCODONE    IR 5 milliGRAM(s) Oral every 6 hours PRN Moderate Pain (4 - 6)  oxyCODONE    IR 10 milliGRAM(s) Oral every 6 hours PRN Severe Pain (7 - 10)  traMADol 50 milliGRAM(s) Oral every 6 hours PRN Mild Pain (1 - 3)    ASSESSMENT AND PLAN:  46F, pmh as above a/w    1. Recurrence of back pain/parasthesia post lumbar laminectomy-  -admitted to spine.   -CT with residual bulging of disc material which compresses ventral thecal sac.   -f/u MRI   -NO MEDICAL CONTRAINDICATIONS FOR OR AT THIS TIME.   -pain control  -incentive spirometry  -physical therapy post op.    2. HTN:  -continue ace-i/bb with hold parameters.   -hold hctz.     3. Macrocytic anemia:  -check b12/folate.     4. Anxiety/depression:  -effexor, prn ativan.     5. Palpitations:  -bb    5. DVT px:  -on hold for OR.     Thank you, will follow.

## 2022-05-11 NOTE — ED STATDOCS - CLINICAL SUMMARY MEDICAL DECISION MAKING FREE TEXT BOX
Admit to spine, pending MRI results. Valeriy Nugent D.O. Admit to spine, pending MRI results. Valeriy SEQUEIRA: patient presented with intractable low back pain, seen by Neuro[facility] and admitted to r/o spinal cord compression. MRI pending. Pain meds ordered. ~Markus Barrow PA-C

## 2022-05-11 NOTE — ED ADULT NURSE NOTE - CHEST MOVEMENT
Alert and oriented to person, place and time, memory intact, behavior appropriate to situation, PERRL. symmetric

## 2022-05-11 NOTE — ED STATDOCS - PHYSICAL EXAMINATION
PA NOTE: GEN: AOX3, NAD. HEENT: Throat clear. Airway is patent. EYES: PERRLA. EOMI. Head: NC/AT. NECK: Supple, No JVD. FROM. C-spine non-tender. CV:S1S2, RRR, LUNGS: Non-labored breathing, no tachypnea. O2sat 100% RA. CTA b/l. No w/r/r. CHEST: Equal chest expansion and rise. No deformity. ABD: Soft, NT/ND, no rebound, no guarding. No CVAT. EXT: No e/c/c. 2+ distal pulses. BACK: +Mild diffuse tenderness lower back lumbar area. Painful ROM. SKIN: No rashes. NEURO: No focal deficits. CN II-XII intact. FROM. 5/5 motor and sensory. ~Markus Barrow PA-C

## 2022-05-11 NOTE — ED STATDOCS - MUSCULOSKELETAL, MLM
range of motion is not limited. Well healed scar to lumbar spine. Mild tenderness. No surrounding redness or discharge.

## 2022-05-11 NOTE — ED ADULT TRIAGE NOTE - CHIEF COMPLAINT QUOTE
c/o back and leg for past 5 weeks, s/p lumbar discectomy 6 weeks ago, sent in by dr. Styles for possible admit [phone] ( requesting call), have been taking dilaudid with no improvement in pain, denies fever, c/o mild incisional area redness and swelling HX: denies

## 2022-05-12 ENCOUNTER — RESULT REVIEW (OUTPATIENT)
Age: 47
End: 2022-05-12

## 2022-05-12 LAB
ANION GAP SERPL CALC-SCNC: 11 MMOL/L — SIGNIFICANT CHANGE UP (ref 5–17)
ANION GAP SERPL CALC-SCNC: 7 MMOL/L — SIGNIFICANT CHANGE UP (ref 5–17)
APTT BLD: 30.4 SEC — SIGNIFICANT CHANGE UP (ref 27.5–35.5)
BASOPHILS # BLD AUTO: 0.04 K/UL — SIGNIFICANT CHANGE UP (ref 0–0.2)
BASOPHILS NFR BLD AUTO: 0.4 % — SIGNIFICANT CHANGE UP (ref 0–2)
BUN SERPL-MCNC: 7 MG/DL — SIGNIFICANT CHANGE UP (ref 7–23)
BUN SERPL-MCNC: 7 MG/DL — SIGNIFICANT CHANGE UP (ref 7–23)
CALCIUM SERPL-MCNC: 8.5 MG/DL — SIGNIFICANT CHANGE UP (ref 8.5–10.1)
CALCIUM SERPL-MCNC: 9.3 MG/DL — SIGNIFICANT CHANGE UP (ref 8.5–10.1)
CHLORIDE SERPL-SCNC: 101 MMOL/L — SIGNIFICANT CHANGE UP (ref 96–108)
CHLORIDE SERPL-SCNC: 99 MMOL/L — SIGNIFICANT CHANGE UP (ref 96–108)
CO2 SERPL-SCNC: 23 MMOL/L — SIGNIFICANT CHANGE UP (ref 22–31)
CO2 SERPL-SCNC: 29 MMOL/L — SIGNIFICANT CHANGE UP (ref 22–31)
CREAT SERPL-MCNC: 0.55 MG/DL — SIGNIFICANT CHANGE UP (ref 0.5–1.3)
CREAT SERPL-MCNC: 0.66 MG/DL — SIGNIFICANT CHANGE UP (ref 0.5–1.3)
CRP SERPL-MCNC: 8 MG/L — HIGH
EGFR: 109 ML/MIN/1.73M2 — SIGNIFICANT CHANGE UP
EGFR: 114 ML/MIN/1.73M2 — SIGNIFICANT CHANGE UP
EOSINOPHIL # BLD AUTO: 0.01 K/UL — SIGNIFICANT CHANGE UP (ref 0–0.5)
EOSINOPHIL NFR BLD AUTO: 0.1 % — SIGNIFICANT CHANGE UP (ref 0–6)
GLUCOSE SERPL-MCNC: 147 MG/DL — HIGH (ref 70–99)
GLUCOSE SERPL-MCNC: 86 MG/DL — SIGNIFICANT CHANGE UP (ref 70–99)
HCG UR QL: NEGATIVE — SIGNIFICANT CHANGE UP
HCT VFR BLD CALC: 30.4 % — LOW (ref 34.5–45)
HCT VFR BLD CALC: 36.8 % — SIGNIFICANT CHANGE UP (ref 34.5–45)
HGB BLD-MCNC: 10.2 G/DL — LOW (ref 11.5–15.5)
HGB BLD-MCNC: 12 G/DL — SIGNIFICANT CHANGE UP (ref 11.5–15.5)
IMM GRANULOCYTES NFR BLD AUTO: 0.5 % — SIGNIFICANT CHANGE UP (ref 0–1.5)
INR BLD: 0.89 RATIO — SIGNIFICANT CHANGE UP (ref 0.88–1.16)
LYMPHOCYTES # BLD AUTO: 0.59 K/UL — LOW (ref 1–3.3)
LYMPHOCYTES # BLD AUTO: 5.7 % — LOW (ref 13–44)
MCHC RBC-ENTMCNC: 32.6 GM/DL — SIGNIFICANT CHANGE UP (ref 32–36)
MCHC RBC-ENTMCNC: 33 PG — SIGNIFICANT CHANGE UP (ref 27–34)
MCHC RBC-ENTMCNC: 33.6 GM/DL — SIGNIFICANT CHANGE UP (ref 32–36)
MCHC RBC-ENTMCNC: 33.6 PG — SIGNIFICANT CHANGE UP (ref 27–34)
MCV RBC AUTO: 100 FL — SIGNIFICANT CHANGE UP (ref 80–100)
MCV RBC AUTO: 101.1 FL — HIGH (ref 80–100)
MONOCYTES # BLD AUTO: 0.12 K/UL — SIGNIFICANT CHANGE UP (ref 0–0.9)
MONOCYTES NFR BLD AUTO: 1.2 % — LOW (ref 2–14)
NEUTROPHILS # BLD AUTO: 9.56 K/UL — HIGH (ref 1.8–7.4)
NEUTROPHILS NFR BLD AUTO: 92.1 % — HIGH (ref 43–77)
PLATELET # BLD AUTO: 242 K/UL — SIGNIFICANT CHANGE UP (ref 150–400)
PLATELET # BLD AUTO: 299 K/UL — SIGNIFICANT CHANGE UP (ref 150–400)
POTASSIUM SERPL-MCNC: 3.7 MMOL/L — SIGNIFICANT CHANGE UP (ref 3.5–5.3)
POTASSIUM SERPL-MCNC: 3.9 MMOL/L — SIGNIFICANT CHANGE UP (ref 3.5–5.3)
POTASSIUM SERPL-SCNC: 3.7 MMOL/L — SIGNIFICANT CHANGE UP (ref 3.5–5.3)
POTASSIUM SERPL-SCNC: 3.9 MMOL/L — SIGNIFICANT CHANGE UP (ref 3.5–5.3)
PROTHROM AB SERPL-ACNC: 10.3 SEC — LOW (ref 10.5–13.4)
RBC # BLD: 3.04 M/UL — LOW (ref 3.8–5.2)
RBC # BLD: 3.64 M/UL — LOW (ref 3.8–5.2)
RBC # FLD: 14 % — SIGNIFICANT CHANGE UP (ref 10.3–14.5)
RBC # FLD: 14.1 % — SIGNIFICANT CHANGE UP (ref 10.3–14.5)
SODIUM SERPL-SCNC: 135 MMOL/L — SIGNIFICANT CHANGE UP (ref 135–145)
SODIUM SERPL-SCNC: 135 MMOL/L — SIGNIFICANT CHANGE UP (ref 135–145)
WBC # BLD: 10.37 K/UL — SIGNIFICANT CHANGE UP (ref 3.8–10.5)
WBC # BLD: 5.2 K/UL — SIGNIFICANT CHANGE UP (ref 3.8–10.5)
WBC # FLD AUTO: 10.37 K/UL — SIGNIFICANT CHANGE UP (ref 3.8–10.5)
WBC # FLD AUTO: 5.2 K/UL — SIGNIFICANT CHANGE UP (ref 3.8–10.5)

## 2022-05-12 PROCEDURE — 88304 TISSUE EXAM BY PATHOLOGIST: CPT | Mod: 26

## 2022-05-12 PROCEDURE — 99232 SBSQ HOSP IP/OBS MODERATE 35: CPT

## 2022-05-12 RX ORDER — CEFAZOLIN SODIUM 1 G
2000 VIAL (EA) INJECTION EVERY 8 HOURS
Refills: 0 | Status: COMPLETED | OUTPATIENT
Start: 2022-05-13 | End: 2022-05-13

## 2022-05-12 RX ORDER — HYDROMORPHONE HYDROCHLORIDE 2 MG/ML
0.5 INJECTION INTRAMUSCULAR; INTRAVENOUS; SUBCUTANEOUS
Refills: 0 | Status: DISCONTINUED | OUTPATIENT
Start: 2022-05-12 | End: 2022-05-12

## 2022-05-12 RX ORDER — MAGNESIUM HYDROXIDE 400 MG/1
30 TABLET, CHEWABLE ORAL EVERY 12 HOURS
Refills: 0 | Status: DISCONTINUED | OUTPATIENT
Start: 2022-05-13 | End: 2022-05-16

## 2022-05-12 RX ORDER — SENNA PLUS 8.6 MG/1
2 TABLET ORAL AT BEDTIME
Refills: 0 | Status: DISCONTINUED | OUTPATIENT
Start: 2022-05-13 | End: 2022-05-16

## 2022-05-12 RX ORDER — HYDROMORPHONE HYDROCHLORIDE 2 MG/ML
0.5 INJECTION INTRAMUSCULAR; INTRAVENOUS; SUBCUTANEOUS
Refills: 0 | Status: DISCONTINUED | OUTPATIENT
Start: 2022-05-12 | End: 2022-05-14

## 2022-05-12 RX ORDER — OXYCODONE HYDROCHLORIDE 5 MG/1
5 TABLET ORAL EVERY 4 HOURS
Refills: 0 | Status: DISCONTINUED | OUTPATIENT
Start: 2022-05-12 | End: 2022-05-14

## 2022-05-12 RX ORDER — DIAZEPAM 5 MG
5 TABLET ORAL EVERY 6 HOURS
Refills: 0 | Status: DISCONTINUED | OUTPATIENT
Start: 2022-05-12 | End: 2022-05-13

## 2022-05-12 RX ORDER — HYDROMORPHONE HYDROCHLORIDE 2 MG/ML
30 INJECTION INTRAMUSCULAR; INTRAVENOUS; SUBCUTANEOUS
Refills: 0 | Status: DISCONTINUED | OUTPATIENT
Start: 2022-05-12 | End: 2022-05-14

## 2022-05-12 RX ORDER — SODIUM CHLORIDE 9 MG/ML
1000 INJECTION, SOLUTION INTRAVENOUS
Refills: 0 | Status: DISCONTINUED | OUTPATIENT
Start: 2022-05-12 | End: 2022-05-12

## 2022-05-12 RX ORDER — FOLIC ACID 0.8 MG
1 TABLET ORAL DAILY
Refills: 0 | Status: DISCONTINUED | OUTPATIENT
Start: 2022-05-12 | End: 2022-05-16

## 2022-05-12 RX ORDER — HYDROMORPHONE HYDROCHLORIDE 2 MG/ML
1 INJECTION INTRAMUSCULAR; INTRAVENOUS; SUBCUTANEOUS EVERY 4 HOURS
Refills: 0 | Status: DISCONTINUED | OUTPATIENT
Start: 2022-05-12 | End: 2022-05-12

## 2022-05-12 RX ORDER — NALOXONE HYDROCHLORIDE 4 MG/.1ML
0.1 SPRAY NASAL
Refills: 0 | Status: DISCONTINUED | OUTPATIENT
Start: 2022-05-12 | End: 2022-05-14

## 2022-05-12 RX ORDER — ONDANSETRON 8 MG/1
4 TABLET, FILM COATED ORAL ONCE
Refills: 0 | Status: COMPLETED | OUTPATIENT
Start: 2022-05-12 | End: 2022-05-12

## 2022-05-12 RX ORDER — OXYCODONE HYDROCHLORIDE 5 MG/1
10 TABLET ORAL EVERY 4 HOURS
Refills: 0 | Status: DISCONTINUED | OUTPATIENT
Start: 2022-05-12 | End: 2022-05-14

## 2022-05-12 RX ORDER — ONDANSETRON 8 MG/1
4 TABLET, FILM COATED ORAL EVERY 6 HOURS
Refills: 0 | Status: DISCONTINUED | OUTPATIENT
Start: 2022-05-12 | End: 2022-05-15

## 2022-05-12 RX ORDER — HYDROMORPHONE HYDROCHLORIDE 2 MG/ML
1 INJECTION INTRAMUSCULAR; INTRAVENOUS; SUBCUTANEOUS
Refills: 0 | Status: DISCONTINUED | OUTPATIENT
Start: 2022-05-12 | End: 2022-05-14

## 2022-05-12 RX ORDER — ONDANSETRON 8 MG/1
4 TABLET, FILM COATED ORAL EVERY 6 HOURS
Refills: 0 | Status: DISCONTINUED | OUTPATIENT
Start: 2022-05-13 | End: 2022-05-15

## 2022-05-12 RX ADMIN — Medication 1 MILLIGRAM(S): at 00:18

## 2022-05-12 RX ADMIN — HYDROMORPHONE HYDROCHLORIDE 1 MILLIGRAM(S): 2 INJECTION INTRAMUSCULAR; INTRAVENOUS; SUBCUTANEOUS at 13:16

## 2022-05-12 RX ADMIN — ONDANSETRON 4 MILLIGRAM(S): 8 TABLET, FILM COATED ORAL at 22:29

## 2022-05-12 RX ADMIN — HYDROMORPHONE HYDROCHLORIDE 0.5 MILLIGRAM(S): 2 INJECTION INTRAMUSCULAR; INTRAVENOUS; SUBCUTANEOUS at 02:22

## 2022-05-12 RX ADMIN — HYDROMORPHONE HYDROCHLORIDE 30 MILLILITER(S): 2 INJECTION INTRAMUSCULAR; INTRAVENOUS; SUBCUTANEOUS at 21:42

## 2022-05-12 RX ADMIN — OXYCODONE HYDROCHLORIDE 10 MILLIGRAM(S): 5 TABLET ORAL at 04:20

## 2022-05-12 RX ADMIN — Medication 5 MILLIGRAM(S): at 13:40

## 2022-05-12 RX ADMIN — OXYCODONE HYDROCHLORIDE 10 MILLIGRAM(S): 5 TABLET ORAL at 16:46

## 2022-05-12 RX ADMIN — SODIUM CHLORIDE 75 MILLILITER(S): 9 INJECTION, SOLUTION INTRAVENOUS at 22:12

## 2022-05-12 RX ADMIN — HYDROMORPHONE HYDROCHLORIDE 0.5 MILLIGRAM(S): 2 INJECTION INTRAMUSCULAR; INTRAVENOUS; SUBCUTANEOUS at 21:30

## 2022-05-12 RX ADMIN — PANTOPRAZOLE SODIUM 40 MILLIGRAM(S): 20 TABLET, DELAYED RELEASE ORAL at 10:31

## 2022-05-12 RX ADMIN — OXYCODONE HYDROCHLORIDE 5 MILLIGRAM(S): 5 TABLET ORAL at 00:06

## 2022-05-12 RX ADMIN — Medication 0.5 MILLIGRAM(S): at 21:55

## 2022-05-12 RX ADMIN — Medication 75 MILLIGRAM(S): at 10:31

## 2022-05-12 RX ADMIN — HYDROMORPHONE HYDROCHLORIDE 0.5 MILLIGRAM(S): 2 INJECTION INTRAMUSCULAR; INTRAVENOUS; SUBCUTANEOUS at 21:18

## 2022-05-12 RX ADMIN — Medication 5 MILLIGRAM(S): at 22:19

## 2022-05-12 RX ADMIN — HYDROMORPHONE HYDROCHLORIDE 0.5 MILLIGRAM(S): 2 INJECTION INTRAMUSCULAR; INTRAVENOUS; SUBCUTANEOUS at 02:52

## 2022-05-12 RX ADMIN — HYDROMORPHONE HYDROCHLORIDE 1 MILLIGRAM(S): 2 INJECTION INTRAMUSCULAR; INTRAVENOUS; SUBCUTANEOUS at 16:09

## 2022-05-12 RX ADMIN — LISINOPRIL 20 MILLIGRAM(S): 2.5 TABLET ORAL at 10:31

## 2022-05-12 RX ADMIN — SODIUM CHLORIDE 75 MILLILITER(S): 9 INJECTION, SOLUTION INTRAVENOUS at 00:18

## 2022-05-12 RX ADMIN — Medication 50 MILLIGRAM(S): at 10:31

## 2022-05-12 RX ADMIN — HYDROMORPHONE HYDROCHLORIDE 1 MILLIGRAM(S): 2 INJECTION INTRAMUSCULAR; INTRAVENOUS; SUBCUTANEOUS at 09:01

## 2022-05-12 NOTE — PROGRESS NOTE ADULT - SUBJECTIVE AND OBJECTIVE BOX
c/c: back pain with radiation down LLE with paresthesias    HPI: 46F, pmh of HTN, Anxiety, palpitations, Depression, GERD, Fibroids, anemia presented to the ER with worsening back pain. She has had chronic back pain X 20 years. Recently in march, was in [place] where all of a sudden she started having severe lower back pain with radiation down LLE with numbness and tingling down LLE. No weakness. She underwent a lumbar laminectomy at [facility] on 4/4/2022. Stayed in the hospital for about 2- 3 days then was discharged home after an uncomplicated stay. She subsequently noticed worsening pain similar to the pain she had prior to surgery. She underwent outpt imaging which she states showed disc fragments affecting the S1 nerve.   She is now admitted for further mx. Hospitalist service consulted for medical comanagement.      pt seen and examined this am. pain not controlled overnight or this am.   didn't sleep at all d/t pain. no new symptoms.  for or today.    ROS: all 10 systems reviewed and is as above otherwise negative.     Vital Signs Last 24 Hrs  T(C): 36.9 (12 May 2022 15:48), Max: 37.4 (12 May 2022 09:35)  T(F): 98.5 (12 May 2022 15:48), Max: 99.3 (12 May 2022 09:35)  HR: 100 (12 May 2022 15:48) (80 - 100)  BP: 105/63 (12 May 2022 15:48) (105/63 - 134/95)  RR: 18 (12 May 2022 15:48) (16 - 18)  SpO2: 100% (12 May 2022 15:48) (100% - 100%)  PHYSICAL EXAM:    GENERAL: uncomfortable, in pain, no acute distress   HEAD:  Normocephalic, atraumatic  EYES: EOMI, PERRLA  HEENT: Moist mucous membranes  NECK: Supple, No JVD  NERVOUS SYSTEM:  Alert & Oriented X3, Motor Strength 5/5 B/L upper and lower extremities  CHEST/LUNG: Clear to auscultation bilaterally  HEART: Regular rate and rhythm  ABDOMEN: Soft, Nontender, Nondistended, Bowel sounds present  GENITOURINARY: Voiding, no palpable bladder  EXTREMITIES:   No clubbing, cyanosis, or edema  MUSCULOSKELETAL- SLR LLE+, incision site lower back without drainage/erythema  SKIN-no rash  LABS:                        12.0   5.20  )-----------( 299      ( 12 May 2022 07:33 )             36.8     05-12    135  |  99  |  7   ----------------------------<  86  3.7   |  29  |  0.66    Ca    9.3      12 May 2022 07:33    TPro  7.6  /  Alb  3.7  /  TBili  0.4  /  DBili  x   /  AST  48<H>  /  ALT  41  /  AlkPhos  47  05-11    PT/INR - ( 12 May 2022 07:33 )   PT: 10.3 sec;   INR: 0.89 ratio         PTT - ( 12 May 2022 07:33 )  PTT:30.4 sec       Xray Chest 1 View- PORTABLE-Urgent (Xray Chest 1 View- PORTABLE-Urgent .) (05.11.22 @ 12:33) >  IMPRESSION: No evidence for focal infiltrate or lobar consolidation.   CT Lumbar Spine No Cont (05.11.22 @ 13:02) >  IMPRESSION:  1.Status post laminectomy. Narrowing of the intervertebral disc space   with endplate degenerative changes and a vacuum disc phenomena.There does   appear to be residual bulging of disc material which impresses on ventral   thecal sac. If there is clinical concern for residual post surgical scar   tissue or residual/recurrent herniation of the nucleus pulposus,   recommend follow-up pre and postcontrast MR imaging of the lumbar spine.   Please refer to recently obtained outside MR imaging prior to further MRI   assessment.  2. Additional lumbar spine degenerative changes, as described level by   level in detail above.  3. No acute fracture or AP plane subluxation.       MR Lumbar Spine w/wo IV Cont (05.11.22 @ 15:28) >  IMPRESSION:    Suspected soft tissue cellulitis with small abscesses and osteomyelitis   around the L5-S1 disc spaces.    Persistent 1.6 x 0.6 x 1.2 cm extruded disc fragment extending up behind   the inferior endplate of L5. There is abnormal surrounding epidural   enhancement and there is mass effect on the descending left S1 nerve root.    Discussed with patient's 5E nurse Pricila vasquez 6:19 PM and Dr. Esequiel Cebalols at 6:23 PM.        EKG: sinus, non specific t wave changes    MEDICATIONS  (STANDING):  lactated ringers. 1000 milliLiter(s) (75 mL/Hr) IV Continuous <Continuous>  lisinopril 20 milliGRAM(s) Oral daily  metoprolol succinate ER 50 milliGRAM(s) Oral daily  pantoprazole    Tablet 40 milliGRAM(s) Oral daily  venlafaxine XR. 75 milliGRAM(s) Oral daily    MEDICATIONS  (PRN):  diazepam    Tablet 5 milliGRAM(s) Oral every 6 hours PRN muscle spasm  HYDROmorphone  Injectable 1 milliGRAM(s) IV Push every 3 hours PRN Severe Pain (7 - 10)  LORazepam     Tablet 1 milliGRAM(s) Oral daily PRN Anxiety  oxyCODONE    IR 5 milliGRAM(s) Oral every 4 hours PRN Mild Pain (1 - 3)  oxyCODONE    IR 10 milliGRAM(s) Oral every 4 hours PRN Moderate Pain (4 - 6)      ASSESSMENT AND PLAN:  46F, pmh as above a/w    1. Recurrence of back pain/parasthesia post lumbar laminectomy-  -admitted to spine.   -CT with residual bulging of disc material which compresses ventral thecal sac.   -MRI as above, showing possible osteomyelitis, but less likely given lack of fever/drainage or erythema at incision site, etc.   -NO MEDICAL CONTRAINDICATIONS FOR OR AT THIS TIME.   -pain control, meds adjusted.   -incentive spirometry  -physical therapy post op.    2. HTN:  -continue ace-i/bb with hold parameters.   -hold hctz.     3. Macrocytic anemia:  -has folate deficiency  -start supplementation.     4. Anxiety/depression:  -effexor, prn ativan.     5. Palpitations:  -bb    5. DVT px:  -on hold for OR.

## 2022-05-12 NOTE — PROGRESS NOTE ADULT - SUBJECTIVE AND OBJECTIVE BOX
Pt sitting comfortably in a chair with mom in the room  States she has significant back pain radiating to the left leg as well as right gluteal area  Ambulating in the room  No new issues over night  States the IV pain meds are helpful  No issues with the wound    VSS A and O X3  NAD    Back wound clean, no drainage   5/5 strength i the hip flexor, quads, hamstrings, EHL, TA and GS  Sens in tact legs  Lumbar pain with B/L SLR, also left leg pain with SLR    MRI and CT discussed with the patient  She understands the finding  Low suspiscion for infection as normal wound, normal WBC and ESR    Plan  Pt to OR today for revision L5/S1 laminectomies, and diskectomy along with fusion, instrumentation and bone grafting  The details were discussed along with the potential risks which include but ar not limited continued pain increased pain, nerve injury, paralysis, bleeding, hematoma, revision surgery, further surgery, non union, hardware complications, spinal fluid leak, wound issues and potential medical risks  She understands these and would like to proceed with the surgery  All questions answered

## 2022-05-12 NOTE — PROVIDER CONTACT NOTE (OTHER) - SITUATION
notified Dr Jordan's office of admission please fax over discharge paperwork once patient is discharged to [phone]

## 2022-05-12 NOTE — BRIEF OPERATIVE NOTE - NSICDXBRIEFPOSTOP_GEN_ALL_CORE_FT
POST-OP DIAGNOSIS:  Spinal stenosis of lumbar region with radiculopathy 12-May-2022 20:39:47  Chidi Martinez

## 2022-05-12 NOTE — PROGRESS NOTE ADULT - SUBJECTIVE AND OBJECTIVE BOX
[facility]                                                           Orthopedic Spine Progress Note          SUBJECTIVE: Patient seen and examined.     Pain (0-10):   Current Pain Management:  [ ] PCA   [ ] Po Analgesics [ ] IM /IV Anagesics     Vital Signs Last 24 Hrs  T(C): 36.6 (11 May 2022 23:41), Max: 36.7 (11 May 2022 11:16)  T(F): 97.9 (11 May 2022 23:41), Max: 98.1 (11 May 2022 11:16)  HR: 80 (11 May 2022 23:41) (77 - 84)  BP: 133/89 (11 May 2022 23:41) (113/91 - 134/95)  BP(mean): 98 (11 May 2022 11:16) (98 - 98)  RR: 17 (11 May 2022 23:41) (16 - 18)  SpO2: 100% (11 May 2022 23:41) (98% - 100%)  I&O's Detail      OBJECTIVE:         Wound /Dressing:   Cervical ROM:  Lumbar: ROM :  Neurological: A/O x               Sensation: [ ] intact to light touch  [ ] decreased:          Motor exam: [  ]          [ ] Upper extremity    Delt      Bicp       Tri      Wrist ext  Wrst Flex       Digit Ext Digit Flex                                     R         5/5        5/5        5/5       5/5            5/5            5/5       5/5          5/5                                     L          5/5        5/5        5/5       5/5            5/5            5/5       5/5          5/5         [ ] Lower ext.     Hip Flx  Hip Ext   Hip Abd  Hip Add Quad   Hamstrg   TA       EHL      GS                              R        5/5        5/5        5/5             5/5        5/5        5/5        5/5     5/5      5/5                              L         5/5        5/5        5/5             5/5        5/5        5/5        5/5     5/5      5/5                                                        [ ] Vascular :             Tension Signs:           Long Tract Findings:     RADIOLOGY & ADDITIONAL STUDIES:          ACC: [id number] EXAM:  MR SPINE LUMBAR WAW IC                          PROCEDURE DATE:  05/11/2022          INTERPRETATION:  MR LUMBAR SPINE WITHOUT AND WITH IV CONTRAST  LUMBAR SPINE MRI    CLINICAL INDICATIONS: ro cord injury, pmh of HTN, Anxiety, palpitations,   Depression, GERD, Fibroids, anemia presented to the ER with worsening   back pain. She has had chronic back pain X 20 years. Recently in march,   was in [place] where all of a sudden she started having severe lower   back pain with radiation down LLE with numbness and tingling down LLE. No   weakness. She underwent a lumbar laminectomy at [facility] on   4/4/2022. Stayed in the hospital for about 2- 3 days then was discharged   home after an uncomplicated stay. She subsequently noticed worsening pain   similar to the pain she had prior to surgery. She underwent outpt imaging   which she states showed disc fragments affecting the S1 nerve.    Technique: Noncontrast MR of the lumbar spine was performed. Sagittal T1,   T2, STIR, axial T1 and T2 sequences were obtained. Then 6 cc Gadavist   administered. 1.5 cc discarded. Sagittal T1 FS post and axial FS post   slabs obtained.    COMPARISON: None.    FINDINGS:    There is straightening of the normal lordotic curvature.    Vertebral bodies are normal in height and signal without fracture or   dislocation.    There is loss of normal disc signal intensity within the L5-S1 disc   spaces.    Evaluation of individual levels:    L1-L2:  Unremarkable.    L2-L3:  Mild loss of posterior disc height. Mild annular bulge.    L3-L4:  Mild loss of posterior disc height. Mild annular bulge.    L4-L5:  Mild loss of posterior disc height. Mild annular bulge.    L5-S1:  Central old extruded disc material measuring 1.6 cm transverse by   0.6 cm AP by 1.2 cm CC extending up behind the inferior endplate of L5.   There is abnormal enhancement of the inferior L5 endplate and minimally   of the superior S1 endplate suggesting osteomyelitis. There is abnormal   surrounding midline softtissue enhancement consistent with infection.   There are small pockets of abnormal fluid suggesting small abscesses.   There is abnormal enhancement of the posterior spinous process and the   surrounding bilateral soft tissues. There is circumferential epidural   enhancement along the spinal canal.    The conus is normal in position and morphology at the L1 level.    IMPRESSION:    Suspected soft tissue cellulitis with small abscesses and osteomyelitis   around the L5-S1 disc spaces.    Persistent 1.6 x 0.6 x 1.2 cm extruded disc fragment extending up behind   the inferior endplate of L5. There is abnormal surrounding epidural   enhancement and there is mass effect on the descending left S1 nerve root.    Discussed with patient's 5E nurse Pricila vasquez 6:19 PM and Dr. Esequiel Ceballos at 6:23 PM.    --- End of Report ---            MARCELO NUNEZ MD; Attending Radiologist  This document has been electronically signed. May 11 2022  6:25PM        ACC: [id number] EXAM:  CT LUMBAR SPINE                          PROCEDURE DATE:  05/11/2022          INTERPRETATION:  CT LUMBAR SPINE    INDICATIONS: Back pain radiating to lower extremities for several weeks.   By history an outside MRI was performed, which is not currently available   for comparison.    TECHNIQUE:  Serial axial images were obtained using multislice helical   technique. Sagittal and coronal reformatted images were performed.    COMPARISON EXAMINATION: None available at this time.  Mild lumbar levoscoliosis with the apex at L3-L4.  FINDINGS:    Multilevel degenerative osteoarthritis is present. Findings include   marginal osteophytes anteriorly, facet joint hypertrophy and    osteophytes. Multilevel degenerative disc disease is present. Findings   include loss of disc space height and endplate sclerosis.    ALIGNMENT: Alignment is anatomic.    L1-L2:  Normal.    L2-L3:  Normal.    L3-L4:  Circumferential disc bulge along with facet and ligamentous   hypertrophic changes, with resultant mild spinal canal stenosis    L4-L5:  Normal.Circumferential disc bulge along with facet and   ligamentous hypertrophic changes, with resultant mild to moderate spinal   canal stenosis.. Mild bilateral neural foraminal narrowing    L5-S1:  Status post laminectomy. Narrowing of the intervertebral disc   space with endplate degenerative changes and a vacuum disc   phenomena.There does appear to be residual bulging of disc material which   impresses on ventral thecal sac. If there is clinical concern for   residual post surgical scar tissue or residual/recurrent herniation of   the nucleus pulposus, recommend follow-up pre and postcontrast MR imaging   of the lumbar spine.    MISCELLANEOUS:  None.    IMPRESSION:    1.Status post laminectomy. Narrowing of the intervertebral disc space   with endplate degenerative changes and a vacuum disc phenomena.There does   appear to be residual bulging of disc material which impresses on ventral   thecal sac. If there is clinical concern for residual post surgical scar   tissue or residual/recurrent herniation of the nucleus pulposus,   recommend follow-up pre and postcontrast MR imaging of the lumbar spine.   Please refer to recently obtained outside MR imaging prior to further MRI   assessment.  2. Additional lumbar spine degenerative changes, as described level by   level in detail above.  3. No acute fracture or AP plane subluxation.    --- End of Report ---            DAWN BEHR-VENTURA MD; Attending Radiologist  This document has been electronically signed. May 11 2022  1:20PM        ACC: [id number] EXAM:  XR CHEST PORTABLE URGENT 1V                          PROCEDURE DATE:  05/11/2022          INTERPRETATION:  INDICATION: Preop    PRIORS: None    VIEWS: Portable AP radiography of the chest performed.    FINDINGS: Heart size appears within normal limits. No hilar or superior   mediastinal abnormalities are identified. There is no evidence for focal   infiltrate, lobar consolidation or pulmonary edema. No pleural effusion   or pneumothorax is demonstrated. No mediastinal shiftis noted. The   visualized osseous structures appear unremarkable.    IMPRESSION: No evidence for focal infiltrate or lobar consolidation.    --- End of Report ---            YONATHAN MARIA MD; Attending Radiologist  This document has been electronically signed. May 11 2022 12:48PM                                             LABS:                        12.0   5.20  )-----------( 299      ( 12 May 2022 07:33 )             36.8     05-12    135  |  99  |  7   ----------------------------<  86  3.7   |  29  |  0.66    Ca    9.3      12 May 2022 07:33    TPro  7.6  /  Alb  3.7  /  TBili  0.4  /  DBili  x   /  AST  48<H>  /  ALT  41  /  AlkPhos  47  05-11    PT/INR - ( 12 May 2022 07:33 )   PT: 10.3 sec;   INR: 0.89 ratio         PTT - ( 12 May 2022 07:33 )  PTT:30.4 sec                                                                         [facility]                                                           Orthopedic Spine Progress Note              Current Pain Management:  [ ] PCA   [ ] Po Analgesics [ ] IM /IV Anagesics     Vital Signs Last 24 Hrs  T(C): 36.6 (11 May 2022 23:41), Max: 36.7 (11 May 2022 11:16)  T(F): 97.9 (11 May 2022 23:41), Max: 98.1 (11 May 2022 11:16)  HR: 80 (11 May 2022 23:41) (77 - 84)  BP: 133/89 (11 May 2022 23:41) (113/91 - 134/95)  BP(mean): 98 (11 May 2022 11:16) (98 - 98)  RR: 17 (11 May 2022 23:41) (16 - 18)  SpO2: 100% (11 May 2022 23:41) (98% - 100%)  I&O's Detail            RADIOLOGY & ADDITIONAL STUDIES:          ACC: [id number] EXAM:  MR SPINE LUMBAR WAW IC                          PROCEDURE DATE:  05/11/2022          INTERPRETATION:  MR LUMBAR SPINE WITHOUT AND WITH IV CONTRAST  LUMBAR SPINE MRI    CLINICAL INDICATIONS: ro cord injury, pmh of HTN, Anxiety, palpitations,   Depression, GERD, Fibroids, anemia presented to the ER with worsening   back pain. She has had chronic back pain X 20 years. Recently in march,   was in [place] where all of a sudden she started having severe lower   back pain with radiation down LLE with numbness and tingling down LLE. No   weakness. She underwent a lumbar laminectomy at [facility] on   4/4/2022. Stayed in the hospital for about 2- 3 days then was discharged   home after an uncomplicated stay. She subsequently noticed worsening pain   similar to the pain she had prior to surgery. She underwent outpt imaging   which she states showed disc fragments affecting the S1 nerve.    Technique: Noncontrast MR of the lumbar spine was performed. Sagittal T1,   T2, STIR, axial T1 and T2 sequences were obtained. Then 6 cc Gadavist   administered. 1.5 cc discarded. Sagittal T1 FS post and axial FS post   slabs obtained.    COMPARISON: None.    FINDINGS:    There is straightening of the normal lordotic curvature.    Vertebral bodies are normal in height and signal without fracture or   dislocation.    There is loss of normal disc signal intensity within the L5-S1 disc   spaces.    Evaluation of individual levels:    L1-L2:  Unremarkable.    L2-L3:  Mild loss of posterior disc height. Mild annular bulge.    L3-L4:  Mild loss of posterior disc height. Mild annular bulge.    L4-L5:  Mild loss of posterior disc height. Mild annular bulge.    L5-S1:  Central old extruded disc material measuring 1.6 cm transverse by   0.6 cm AP by 1.2 cm CC extending up behind the inferior endplate of L5.   There is abnormal enhancement of the inferior L5 endplate and minimally   of the superior S1 endplate suggesting osteomyelitis. There is abnormal   surrounding midline soft tissue enhancement consistent with infection.   There are small pockets of abnormal fluid suggesting small abscesses.   There is abnormal enhancement of the posterior spinous process and the   surrounding bilateral soft tissues. There is circumferential epidural   enhancement along the spinal canal.    The conus is normal in position and morphology at the L1 level.    IMPRESSION:    Suspected soft tissue cellulitis with small abscesses and osteomyelitis   around the L5-S1 disc spaces.    Persistent 1.6 x 0.6 x 1.2 cm extruded disc fragment extending up behind   the inferior endplate of L5. There is abnormal surrounding epidural   enhancement and there is mass effect on the descending left S1 nerve root.    Discussed with patient's 5E nurse Pricila head 6:19 PM and Dr. Esequiel Ceballos at 6:23 PM.    --- End of Report ---            MARCELO NUNEZ MD; Attending Radiologist  This document has been electronically signed. May 11 2022  6:25PM        ACC: [id number] EXAM:  CT LUMBAR SPINE                          PROCEDURE DATE:  05/11/2022          INTERPRETATION:  CT LUMBAR SPINE    INDICATIONS: Back pain radiating to lower extremities for several weeks.   By history an outside MRI was performed, which is not currently available   for comparison.    TECHNIQUE:  Serial axial images were obtained using multislice helical   technique. Sagittal and coronal reformatted images were performed.    COMPARISON EXAMINATION: None available at this time.  Mild lumbar levoscoliosis with the apex at L3-L4.  FINDINGS:    Multilevel degenerative osteoarthritis is present. Findings include   marginal osteophytes anteriorly, facet joint hypertrophy and    osteophytes. Multilevel degenerative disc disease is present. Findings   include loss of disc space height and endplate sclerosis.    ALIGNMENT: Alignment is anatomic.    L1-L2:  Normal.    L2-L3:  Normal.    L3-L4:  Circumferential disc bulge along with facet and ligamentous   hypertrophic changes, with resultant mild spinal canal stenosis    L4-L5:  Normal. Circumferential disc bulge along with facet and   ligamentous hypertrophic changes, with resultant mild to moderate spinal   canal stenosis.. Mild bilateral neural foraminal narrowing    L5-S1:  Status post laminectomy. Narrowing of the intervertebral disc   space with endplate degenerative changes and a vacuum disc   phenomena. There does appear to be residual bulging of disc material which   impresses on ventral thecal sac. If there is clinical concern for   residual post surgical scar tissue or residual/recurrent herniation of   the nucleus pulposus, recommend follow-up pre and postcontrast MR imaging   of the lumbar spine.    MISCELLANEOUS:  None.    IMPRESSION:    1.Status post laminectomy. Narrowing of the intervertebral disc space   with endplate degenerative changes and a vacuum disc phenomena. There does   appear to be residual bulging of disc material which impresses on ventral   thecal sac. If there is clinical concern for residual post surgical scar   tissue or residual/recurrent herniation of the nucleus pulposus,   recommend follow-up pre and postcontrast MR imaging of the lumbar spine.   Please refer to recently obtained outside MR imaging prior to further MRI   assessment.  2. Additional lumbar spine degenerative changes, as described level by   level in detail above.  3. No acute fracture or AP plane subluxation.    --- End of Report ---            DAWN BEHR-VENTURA MD; Attending Radiologist  This document has been electronically signed. May 11 2022  1:20PM        ACC: [id number] EXAM:  XR CHEST PORTABLE URGENT 1V                          PROCEDURE DATE:  05/11/2022          INTERPRETATION:  INDICATION: Preop    PRIORS: None    VIEWS: Portable AP radiography of the chest performed.    FINDINGS: Heart size appears within normal limits. No hilar or superior   mediastinal abnormalities are identified. There is no evidence for focal   infiltrate, lobar consolidation or pulmonary edema. No pleural effusion   or pneumothorax is demonstrated. No mediastinal shift is noted. The   visualized osseous structures appear unremarkable.    IMPRESSION: No evidence for focal infiltrate or lobar consolidation.    --- End of Report ---            YONATHAN MARIA MD; Attending Radiologist  This document has been electronically signed. May 11 2022 12:48PM                                             LABS:                        12.0   5.20  )-----------( 299      ( 12 May 2022 07:33 )             36.8     05-12    135  |  99  |  7   ----------------------------<  86  3.7   |  29  |  0.66    Ca    9.3      12 May 2022 07:33    TPro  7.6  /  Alb  3.7  /  TBili  0.4  /  DBili  x   /  AST  48<H>  /  ALT  41  /  AlkPhos  47  05-11    PT/INR - ( 12 May 2022 07:33 )   PT: 10.3 sec;   INR: 0.89 ratio         PTT - ( 12 May 2022 07:33 )  PTT:30.4 sec                                                                         [facility]                                                           Orthopedic Spine Progress Note        Patient is medically cleared for OR today for L5-S1 laminectomy/fusion with Dr. Martinez      Current Pain Management:  [ ] PCA   [x] Po Analgesics [x] IM /IV Anagesics     Vital Signs Last 24 Hrs  T(C): 36.6 (11 May 2022 23:41), Max: 36.7 (11 May 2022 11:16)  T(F): 97.9 (11 May 2022 23:41), Max: 98.1 (11 May 2022 11:16)  HR: 80 (11 May 2022 23:41) (77 - 84)  BP: 133/89 (11 May 2022 23:41) (113/91 - 134/95)  BP(mean): 98 (11 May 2022 11:16) (98 - 98)  RR: 17 (11 May 2022 23:41) (16 - 18)  SpO2: 100% (11 May 2022 23:41) (98% - 100%)  I&O's Detail            RADIOLOGY & ADDITIONAL STUDIES:          ACC: [id number] EXAM:  MR SPINE LUMBAR WAW IC                          PROCEDURE DATE:  05/11/2022          INTERPRETATION:  MR LUMBAR SPINE WITHOUT AND WITH IV CONTRAST  LUMBAR SPINE MRI    CLINICAL INDICATIONS: ro cord injury, pmh of HTN, Anxiety, palpitations,   Depression, GERD, Fibroids, anemia presented to the ER with worsening   back pain. She has had chronic back pain X 20 years. Recently in march,   was in [facility] where all of a sudden she started having severe lower   back pain with radiation down LLE with numbness and tingling down LLE. No   weakness. She underwent a lumbar laminectomy at [facility] on   4/4/2022. Stayed in the hospital for about 2- 3 days then was discharged   home after an uncomplicated stay. She subsequently noticed worsening pain   similar to the pain she had prior to surgery. She underwent outpt imaging   which she states showed disc fragments affecting the S1 nerve.    Technique: Noncontrast MR of the lumbar spine was performed. Sagittal T1,   T2, STIR, axial T1 and T2 sequences were obtained. Then 6 cc Gadavist   administered. 1.5 cc discarded. Sagittal T1 FS post and axial FS post   slabs obtained.    COMPARISON: None.    FINDINGS:    There is straightening of the normal lordotic curvature.    Vertebral bodies are normal in height and signal without fracture or   dislocation.    There is loss of normal disc signal intensity within the L5-S1 disc   spaces.    Evaluation of individual levels:    L1-L2:  Unremarkable.    L2-L3:  Mild loss of posterior disc height. Mild annular bulge.    L3-L4:  Mild loss of posterior disc height. Mild annular bulge.    L4-L5:  Mild loss of posterior disc height. Mild annular bulge.    L5-S1:  Central old extruded disc material measuring 1.6 cm transverse by   0.6 cm AP by 1.2 cm CC extending up behind the inferior endplate of L5.   There is abnormal enhancement of the inferior L5 endplate and minimally   of the superior S1 endplate suggesting osteomyelitis. There is abnormal   surrounding midline soft tissue enhancement consistent with infection.   There are small pockets of abnormal fluid suggesting small abscesses.   There is abnormal enhancement of the posterior spinous process and the   surrounding bilateral soft tissues. There is circumferential epidural   enhancement along the spinal canal.    The conus is normal in position and morphology at the L1 level.    IMPRESSION:    Suspected soft tissue cellulitis with small abscesses and osteomyelitis   around the L5-S1 disc spaces.    Persistent 1.6 x 0.6 x 1.2 cm extruded disc fragment extending up behind   the inferior endplate of L5. There is abnormal surrounding epidural   enhancement and there is mass effect on the descending left S1 nerve root.    Discussed with patient's 5E nurse Pricila vasquez 6:19 PM and Dr. Esequiel Ceballos at 6:23 PM.    --- End of Report ---            MARCELO NUNEZ MD; Attending Radiologist  This document has been electronically signed. May 11 2022  6:25PM        ACC: [id number] EXAM:  CT LUMBAR SPINE                          PROCEDURE DATE:  05/11/2022          INTERPRETATION:  CT LUMBAR SPINE    INDICATIONS: Back pain radiating to lower extremities for several weeks.   By history an outside MRI was performed, which is not currently available   for comparison.    TECHNIQUE:  Serial axial images were obtained using multislice helical   technique. Sagittal and coronal reformatted images were performed.    COMPARISON EXAMINATION: None available at this time.  Mild lumbar levoscoliosis with the apex at L3-L4.  FINDINGS:    Multilevel degenerative osteoarthritis is present. Findings include   marginal osteophytes anteriorly, facet joint hypertrophy and    osteophytes. Multilevel degenerative disc disease is present. Findings   include loss of disc space height and endplate sclerosis.    ALIGNMENT: Alignment is anatomic.    L1-L2:  Normal.    L2-L3:  Normal.    L3-L4:  Circumferential disc bulge along with facet and ligamentous   hypertrophic changes, with resultant mild spinal canal stenosis    L4-L5:  Normal. Circumferential disc bulge along with facet and   ligamentous hypertrophic changes, with resultant mild to moderate spinal   canal stenosis.. Mild bilateral neural foraminal narrowing    L5-S1:  Status post laminectomy. Narrowing of the intervertebral disc   space with endplate degenerative changes and a vacuum disc   phenomena. There does appear to be residual bulging of disc material which   impresses on ventral thecal sac. If there is clinical concern for   residual post surgical scar tissue or residual/recurrent herniation of   the nucleus pulposus, recommend follow-up pre and postcontrast MR imaging   of the lumbar spine.    MISCELLANEOUS:  None.    IMPRESSION:    1.Status post laminectomy. Narrowing of the intervertebral disc space   with endplate degenerative changes and a vacuum disc phenomena. There does   appear to be residual bulging of disc material which impresses on ventral   thecal sac. If there is clinical concern for residual post surgical scar   tissue or residual/recurrent herniation of the nucleus pulposus,   recommend follow-up pre and postcontrast MR imaging of the lumbar spine.   Please refer to recently obtained outside MR imaging prior to further MRI   assessment.  2. Additional lumbar spine degenerative changes, as described level by   level in detail above.  3. No acute fracture or AP plane subluxation.    --- End of Report ---            DAWN BEHR-VENTURA MD; Attending Radiologist  This document has been electronically signed. May 11 2022  1:20PM        ACC: [id number] EXAM:  XR CHEST PORTABLE URGENT 1V                          PROCEDURE DATE:  05/11/2022          INTERPRETATION:  INDICATION: Preop    PRIORS: None    VIEWS: Portable AP radiography of the chest performed.    FINDINGS: Heart size appears within normal limits. No hilar or superior   mediastinal abnormalities are identified. There is no evidence for focal   infiltrate, lobar consolidation or pulmonary edema. No pleural effusion   or pneumothorax is demonstrated. No mediastinal shift is noted. The   visualized osseous structures appear unremarkable.    IMPRESSION: No evidence for focal infiltrate or lobar consolidation.    --- End of Report ---            YONATHAN MARIA MD; Attending Radiologist  This document has been electronically signed. May 11 2022 12:48PM                                             LABS:                        12.0   5.20  )-----------( 299      ( 12 May 2022 07:33 )             36.8     05-12    135  |  99  |  7   ----------------------------<  86  3.7   |  29  |  0.66    Ca    9.3      12 May 2022 07:33    TPro  7.6  /  Alb  3.7  /  TBili  0.4  /  DBili  x   /  AST  48<H>  /  ALT  41  /  AlkPhos  47  05-11    PT/INR - ( 12 May 2022 07:33 )   PT: 10.3 sec;   INR: 0.89 ratio         PTT - ( 12 May 2022 07:33 )  PTT:30.4 sec

## 2022-05-12 NOTE — BRIEF OPERATIVE NOTE - NSICDXBRIEFPROCEDURE_GEN_ALL_CORE_FT
PROCEDURES:  Fusion, spine, lumbar, using posterior interbody technique 12-May-2022 20:38:36 L5/S1 revision laminectomies/diskectomy, combined posterior and interbody fusion, instrumentation, allograft, BMP Chidi Martinez

## 2022-05-12 NOTE — PROGRESS NOTE ADULT - ASSESSMENT
A/P: recurrent HNP L5-S1, intractable pain - stable  1. OR w/Michelle Tovar later today for L5-S1 laminectomy/fusion A/P: recurrent HNP L5-S1, intractable pain - stable  1. for OR w/Dr. Martinez later today for L5-S1 laminectomy/fusion

## 2022-05-12 NOTE — BRIEF OPERATIVE NOTE - NSICDXBRIEFPREOP_GEN_ALL_CORE_FT
PRE-OP DIAGNOSIS:  Spinal stenosis of lumbar region with radiculopathy 12-May-2022 20:39:18  Chidi Martinez

## 2022-05-13 DIAGNOSIS — M54.16 RADICULOPATHY, LUMBAR REGION: ICD-10-CM

## 2022-05-13 PROCEDURE — 99232 SBSQ HOSP IP/OBS MODERATE 35: CPT

## 2022-05-13 RX ORDER — CYCLOBENZAPRINE HYDROCHLORIDE 10 MG/1
10 TABLET, FILM COATED ORAL ONCE
Refills: 0 | Status: COMPLETED | OUTPATIENT
Start: 2022-05-13 | End: 2022-05-13

## 2022-05-13 RX ORDER — CYCLOBENZAPRINE HYDROCHLORIDE 10 MG/1
10 TABLET, FILM COATED ORAL EVERY 8 HOURS
Refills: 0 | Status: DISCONTINUED | OUTPATIENT
Start: 2022-05-13 | End: 2022-05-14

## 2022-05-13 RX ADMIN — LISINOPRIL 20 MILLIGRAM(S): 2.5 TABLET ORAL at 09:32

## 2022-05-13 RX ADMIN — Medication 100 MILLIGRAM(S): at 20:46

## 2022-05-13 RX ADMIN — Medication 1 MILLIGRAM(S): at 21:56

## 2022-05-13 RX ADMIN — Medication 1 MILLIGRAM(S): at 09:32

## 2022-05-13 RX ADMIN — Medication 50 MILLIGRAM(S): at 09:32

## 2022-05-13 RX ADMIN — CYCLOBENZAPRINE HYDROCHLORIDE 10 MILLIGRAM(S): 10 TABLET, FILM COATED ORAL at 21:56

## 2022-05-13 RX ADMIN — CYCLOBENZAPRINE HYDROCHLORIDE 10 MILLIGRAM(S): 10 TABLET, FILM COATED ORAL at 14:40

## 2022-05-13 RX ADMIN — CYCLOBENZAPRINE HYDROCHLORIDE 10 MILLIGRAM(S): 10 TABLET, FILM COATED ORAL at 09:32

## 2022-05-13 RX ADMIN — Medication 1 MILLIGRAM(S): at 10:04

## 2022-05-13 RX ADMIN — Medication 1 MILLIGRAM(S): at 16:15

## 2022-05-13 RX ADMIN — Medication 100 MILLIGRAM(S): at 12:14

## 2022-05-13 RX ADMIN — PANTOPRAZOLE SODIUM 40 MILLIGRAM(S): 20 TABLET, DELAYED RELEASE ORAL at 09:33

## 2022-05-13 RX ADMIN — Medication 75 MILLIGRAM(S): at 09:32

## 2022-05-13 RX ADMIN — HYDROMORPHONE HYDROCHLORIDE 30 MILLILITER(S): 2 INJECTION INTRAMUSCULAR; INTRAVENOUS; SUBCUTANEOUS at 16:28

## 2022-05-13 NOTE — PROGRESS NOTE ADULT - SUBJECTIVE AND OBJECTIVE BOX
c/c: back pain with radiation down LLE with paresthesias    HPI: 46F, pmh of HTN, Anxiety, palpitations, Depression, GERD, Fibroids, anemia presented to the ER with worsening back pain. She has had chronic back pain X 20 years. Recently in march, was in [place] where all of a sudden she started having severe lower back pain with radiation down LLE with numbness and tingling down LLE. No weakness. She underwent a lumbar laminectomy at [facility] on 4/4/2022. Stayed in the hospital for about 2- 3 days then was discharged home after an uncomplicated stay. She subsequently noticed worsening pain similar to the pain she had prior to surgery. She underwent outpt imaging which she states showed disc fragments affecting the S1 nerve.   She is now admitted for further mx. Hospitalist service consulted for medical comanagement.    She underwent L5-S1 revision laminectomy with psf 5/12.     pt seen and examined this am. did not feel her pain was controlled with current dilaudid pca.  c/o right sided pain which is new and started yesterday. still with left sided pain and parasthesias. but when asked states overall pain is better than prior to surgery. Pt also very anxious. She is able to get out of bed and ambulate. no sob/chest pain. Tolerating po but not very hungry.     ROS: all 10 systems reviewed and is as above otherwise negative.     Vital Signs Last 24 Hrs  T(C): 37.1 (13 May 2022 12:15), Max: 37.1 (13 May 2022 04:13)  T(F): 98.7 (13 May 2022 12:15), Max: 98.8 (13 May 2022 04:13)  HR: 98 (13 May 2022 12:15) (96 - 114)  BP: 90/60 (13 May 2022 12:15) (90/60 - 144/112)  BP(mean): 84 (13 May 2022 04:13) (84 - 84)  RR: 18 (13 May 2022 12:15) (8 - 18)  SpO2: 98% (13 May 2022 12:15) (96% - 100%)  PHYSICAL EXAM:    GENERAL: uncomfortable, in pain, no acute distress   HEAD:  Normocephalic, atraumatic  EYES: EOMI, PERRLA  HEENT: Moist mucous membranes  NECK: Supple, No JVD  NERVOUS SYSTEM:  Alert & Oriented X3, Motor Strength 5/5 B/L upper and lower extremities  CHEST/LUNG: Clear to auscultation bilaterally  HEART: Regular rate and rhythm  ABDOMEN: Soft, Nontender, Nondistended, Bowel sounds present  GENITOURINARY: Voiding, no palpable bladder  EXTREMITIES:   No clubbing, cyanosis, or edema  MUSCULOSKELETAL- back dressing intact. +Hemovac.   SKIN-no rash    LABS:                        10.2   10.37 )-----------( 242      ( 12 May 2022 22:28 )             30.4     05-12    135  |  101  |  7   ----------------------------<  147<H>  3.9   |  23  |  0.55    Ca    8.5      12 May 2022 22:28      PT/INR - ( 12 May 2022 07:33 )   PT: 10.3 sec;   INR: 0.89 ratio         PTT - ( 12 May 2022 07:33 )  PTT:30.4 sec       Xray Chest 1 View- PORTABLE-Urgent (Xray Chest 1 View- PORTABLE-Urgent .) (05.11.22 @ 12:33) >  IMPRESSION: No evidence for focal infiltrate or lobar consolidation.   CT Lumbar Spine No Cont (05.11.22 @ 13:02) >  IMPRESSION:  1.Status post laminectomy. Narrowing of the intervertebral disc space   with endplate degenerative changes and a vacuum disc phenomena.There does   appear to be residual bulging of disc material which impresses on ventral   thecal sac. If there is clinical concern for residual post surgical scar   tissue or residual/recurrent herniation of the nucleus pulposus,   recommend follow-up pre and postcontrast MR imaging of the lumbar spine.   Please refer to recently obtained outside MR imaging prior to further MRI   assessment.  2. Additional lumbar spine degenerative changes, as described level by   level in detail above.  3. No acute fracture or AP plane subluxation.       MR Lumbar Spine w/wo IV Cont (05.11.22 @ 15:28) >  IMPRESSION:    Suspected soft tissue cellulitis with small abscesses and osteomyelitis   around the L5-S1 disc spaces.    Persistent 1.6 x 0.6 x 1.2 cm extruded disc fragment extending up behind   the inferior endplate of L5. There is abnormal surrounding epidural   enhancement and there is mass effect on the descending left S1 nerve root.    Discussed with patient's 5E nurse Pricila head 6:19 PM and Dr. Esequiel Ceballos at 6:23 PM.        EKG: sinus, non specific t wave changes    MEDICATIONS  (STANDING):  lactated ringers. 1000 milliLiter(s) (75 mL/Hr) IV Continuous <Continuous>  lisinopril 20 milliGRAM(s) Oral daily  metoprolol succinate ER 50 milliGRAM(s) Oral daily  pantoprazole    Tablet 40 milliGRAM(s) Oral daily  venlafaxine XR. 75 milliGRAM(s) Oral daily    MEDICATIONS  (PRN):  diazepam    Tablet 5 milliGRAM(s) Oral every 6 hours PRN muscle spasm  HYDROmorphone  Injectable 1 milliGRAM(s) IV Push every 3 hours PRN Severe Pain (7 - 10)  LORazepam     Tablet 1 milliGRAM(s) Oral daily PRN Anxiety  oxyCODONE    IR 5 milliGRAM(s) Oral every 4 hours PRN Mild Pain (1 - 3)  oxyCODONE    IR 10 milliGRAM(s) Oral every 4 hours PRN Moderate Pain (4 - 6)      ASSESSMENT AND PLAN:  46F, pmh as above a/w    1. Recurrence of back pain/parasthesia post lumbar laminectomy-  -admitted to spine.   -CT with residual bulging of disc material which compresses ventral thecal sac.   -MRI as above, showing possible osteomyelitis, but less likely given lack of fever/drainage or erythema at incision site, etc.   -s/p L5-S1 revision laminectomy POD#1  -pain control with pca  -prn flexeril.  -incentive spirometry  -physical therapy post op.    2. HTN:  -continue ace-i/bb with hold parameters.   -hold hctz.     3. Macrocytic anemia:  -has folate deficiency  -supplementation ordered    4. Anxiety/depression:  -effexor, increase prn ativan to tid.     5. Palpitations:  -bb    6. DVT px:  -venodynes.

## 2022-05-13 NOTE — PROGRESS NOTE ADULT - SUBJECTIVE AND OBJECTIVE BOX
Patient POD1 from L5-S1 revision laminectomy with PSF. Patient tolerated the procedure well. Patient seen and examined at bedside. Patient complaining of low back pain and discomfort with morales catheter. Denies weakness, numbness or tingling. Denies chest pain, shortness of breath, nausea or vomiting.       PE:  Vital Signs Last 24 Hrs  T(C): 37.1 (05-13-22 @ 04:13), Max: 37.4 (05-12-22 @ 09:35)  T(F): 98.8 (05-13-22 @ 04:13), Max: 99.3 (05-12-22 @ 09:35)  HR: 100 (05-13-22 @ 04:13) (95 - 114)  BP: 117/73 (05-13-22 @ 04:13) (105/63 - 144/112)  BP(mean): 84 (05-13-22 @ 04:13) (84 - 84)  RR: 16 (05-13-22 @ 04:13) (8 - 18)  SpO2: 99% (05-13-22 @ 04:13) (96% - 100%)    General: NAD, resting comforatbly in bed  Dressing C/D/I  HMV Drain present  2+ radial pulses  2+ DP Pulses    Motor:                   C5                C6              C7               C8           T1   R             5/5                5/5            5/5              5/5          5/5  L             5/5                5/5            5/5              5/5          5/5                    L2                  L3             L4              L5            S1  R            5/5                5/5             5/5            5/5          5/5  L             5/5                5/5            5/5            5/5          5/5    Sensory:            C5         C6         C7      C8       T1        (0=absent, 1=impaired, 2=normal, NT=not testable)  R         2            2           2        2         2  L          2            2           2        2         2               L2          L3         L4      L5       S1         (0=absent, 1=impaired, 2=normal, NT=not testable)  R         2            2            2        2        2  L          2            2           2        2         2                              10.2   10.37 )-----------( 242      ( 12 May 2022 22:28 )             30.4     12 May 2022 22:28    135    |  101    |  7      ----------------------------<  147    3.9     |  23     |  0.55     Ca    8.5        12 May 2022 22:28    TPro  7.6    /  Alb  3.7    /  TBili  0.4    /  DBili  x      /  AST  48     /  ALT  41     /  AlkPhos  47     11 May 2022 12:16    PT/INR - ( 12 May 2022 07:33 )   PT: 10.3 sec;   INR: 0.89 ratio         PTT - ( 12 May 2022 07:33 )  PTT:30.4 sec    A/P:  46y f s/p  POD1 from L5-S1 revision laminectomy with PSF.  -PT/OT -WBAT  -LSO when working with PT  -Pain Control  -DVT ppx - no chemical dvt ppx, SCDs only  -Continue perioperative abx x 24 hours  -FU AM Labs  -Rest, ice, compress and elevate the extremity as we needed  -Incentive Spirometry  -Medical management appreciated  -Kevin to be DCd this morning

## 2022-05-13 NOTE — PHYSICAL THERAPY INITIAL EVALUATION ADULT - GENERAL OBSERVATIONS, REHAB EVAL
Pt was found standing near bed with IV,PCA,hemovac on, Pt amb independently holding onto IV pole, c/o 8/10 pain, willing to participate in PT, Awaiting LSO

## 2022-05-13 NOTE — PROGRESS NOTE ADULT - SUBJECTIVE AND OBJECTIVE BOX
POD1 s/p L5/S1 instrumentation, fusion, laminectomies  Pt was sleeping comf  Was able to rise out of bed and sit in the chair on her own  Appears comfortable  No new LE issues  drain in place  Brown has been removed  Mom and  at bedside    AF, VSS  moving legs strongly, 5/5 strength in the LE muscle  sens intact legs  Drain and dressing in place    A/P  S/P L5/S1 lami/fusion  Discussed surgery details and showed the family and pt the intra op xrays  Cont PCA  Discussed her pain mangement post op and discharge.  Will eventually benefit from a pain specialist  Add Ativan for anxiety  PT  All questions answered

## 2022-05-13 NOTE — PHYSICAL THERAPY INITIAL EVALUATION ADULT - CRITERIA FOR SKILLED THERAPEUTIC INTERVENTIONS
impairments found/risk reduction/prevention/therapy frequency/predicted duration of therapy intervention

## 2022-05-13 NOTE — PHYSICAL THERAPY INITIAL EVALUATION ADULT - PERTINENT HX OF CURRENT PROBLEM, REHAB EVAL
Pt presented to the ER with worsening back pain.She has had chronic back pain X 20 years.Pt was in [place] where all of a sudden she started having severe LBP with radiation down LLE with numbness and tingling.She underwent a lumbar laminectomy at [facility] on 4/4/2022. Pt was dc home,She subsequently noticed worsening pain,outpt imaging which she states showed disc fragments affecting the S1 nerve.  She underwent L5-S1 revision laminectomy with psf

## 2022-05-13 NOTE — PROGRESS NOTE ADULT - PROBLEM SELECTOR PLAN 1
Fit/dispensed LSO.  Pt to use as directed by .  Please call [facility] with any questions, [phone].

## 2022-05-13 NOTE — PROGRESS NOTE ADULT - SUBJECTIVE AND OBJECTIVE BOX
POD#1. Pt seen resting in bed. Pt has incisional site soreness. She has pain of the posterior aspect of LLE to the left foot and right buttock pain which was present pre-op and unchanged. Pt has numbness of the toes of the left foot present pre-op and unchanged. Morales discontinued earlier this morning. She voided on own since removal of morales. Uses PCA frequently, She states PCA helps somewhat, but still has pain.  She expressed she was on higher dose of Dilaudid with her previous surgery and requested changing Dilaudid dosage.    PE  Gen appearance: NAD.   Motor strength: 5/5 of b/l HF, quads, ant tibs, gastrocs, and EHL  Sensation: Hyposensitive of the left greater toe. Otherwise intact to light touch of b/l lower extremities   no calf tenderness bilat  Dressing clean and intact. Drain: overnight 175cc per Nurse.     Plan  Afebrile, HR:96, BP:113/64  WBC: wnl, H/H:10.2/30.4  Mobilize with physical therapy and encouraged incentive spirometer.   PCA adjustment per Anesthesiology team.   Discontinued Valium.  Drain kept.    Brace ordered. Will call [facility] for brace.   Discussed case with Dr. Martinez.

## 2022-05-14 LAB
ANION GAP SERPL CALC-SCNC: 10 MMOL/L — SIGNIFICANT CHANGE UP (ref 5–17)
APPEARANCE UR: ABNORMAL
BASOPHILS # BLD AUTO: 0.04 K/UL — SIGNIFICANT CHANGE UP (ref 0–0.2)
BASOPHILS NFR BLD AUTO: 0.4 % — SIGNIFICANT CHANGE UP (ref 0–2)
BILIRUB UR-MCNC: ABNORMAL
BUN SERPL-MCNC: 6 MG/DL — LOW (ref 7–23)
CALCIUM SERPL-MCNC: 8.5 MG/DL — SIGNIFICANT CHANGE UP (ref 8.5–10.1)
CHLORIDE SERPL-SCNC: 107 MMOL/L — SIGNIFICANT CHANGE UP (ref 96–108)
CO2 SERPL-SCNC: 20 MMOL/L — LOW (ref 22–31)
COLOR SPEC: ABNORMAL
CREAT SERPL-MCNC: 0.62 MG/DL — SIGNIFICANT CHANGE UP (ref 0.5–1.3)
DIFF PNL FLD: ABNORMAL
EGFR: 111 ML/MIN/1.73M2 — SIGNIFICANT CHANGE UP
EOSINOPHIL # BLD AUTO: 0.04 K/UL — SIGNIFICANT CHANGE UP (ref 0–0.5)
EOSINOPHIL NFR BLD AUTO: 0.4 % — SIGNIFICANT CHANGE UP (ref 0–6)
GLUCOSE SERPL-MCNC: 115 MG/DL — HIGH (ref 70–99)
GLUCOSE UR QL: NEGATIVE — SIGNIFICANT CHANGE UP
HCT VFR BLD CALC: 27.8 % — LOW (ref 34.5–45)
HGB BLD-MCNC: 9.1 G/DL — LOW (ref 11.5–15.5)
IMM GRANULOCYTES NFR BLD AUTO: 0.3 % — SIGNIFICANT CHANGE UP (ref 0–1.5)
KETONES UR-MCNC: ABNORMAL
LEUKOCYTE ESTERASE UR-ACNC: ABNORMAL
LYMPHOCYTES # BLD AUTO: 1.83 K/UL — SIGNIFICANT CHANGE UP (ref 1–3.3)
LYMPHOCYTES # BLD AUTO: 18.9 % — SIGNIFICANT CHANGE UP (ref 13–44)
MCHC RBC-ENTMCNC: 32.7 GM/DL — SIGNIFICANT CHANGE UP (ref 32–36)
MCHC RBC-ENTMCNC: 34 PG — SIGNIFICANT CHANGE UP (ref 27–34)
MCV RBC AUTO: 103.7 FL — HIGH (ref 80–100)
MONOCYTES # BLD AUTO: 0.78 K/UL — SIGNIFICANT CHANGE UP (ref 0–0.9)
MONOCYTES NFR BLD AUTO: 8.1 % — SIGNIFICANT CHANGE UP (ref 2–14)
NEUTROPHILS # BLD AUTO: 6.95 K/UL — SIGNIFICANT CHANGE UP (ref 1.8–7.4)
NEUTROPHILS NFR BLD AUTO: 71.9 % — SIGNIFICANT CHANGE UP (ref 43–77)
NITRITE UR-MCNC: NEGATIVE — SIGNIFICANT CHANGE UP
PH UR: 6 — SIGNIFICANT CHANGE UP (ref 5–8)
PLATELET # BLD AUTO: 250 K/UL — SIGNIFICANT CHANGE UP (ref 150–400)
POTASSIUM SERPL-MCNC: 3.3 MMOL/L — LOW (ref 3.5–5.3)
POTASSIUM SERPL-SCNC: 3.3 MMOL/L — LOW (ref 3.5–5.3)
PROT UR-MCNC: 15
RBC # BLD: 2.68 M/UL — LOW (ref 3.8–5.2)
RBC # FLD: 14.7 % — HIGH (ref 10.3–14.5)
SODIUM SERPL-SCNC: 137 MMOL/L — SIGNIFICANT CHANGE UP (ref 135–145)
SP GR SPEC: 1.01 — SIGNIFICANT CHANGE UP (ref 1.01–1.02)
UROBILINOGEN FLD QL: 4
WBC # BLD: 9.67 K/UL — SIGNIFICANT CHANGE UP (ref 3.8–10.5)
WBC # FLD AUTO: 9.67 K/UL — SIGNIFICANT CHANGE UP (ref 3.8–10.5)

## 2022-05-14 PROCEDURE — 71045 X-RAY EXAM CHEST 1 VIEW: CPT | Mod: 26

## 2022-05-14 PROCEDURE — 74176 CT ABD & PELVIS W/O CONTRAST: CPT | Mod: 26

## 2022-05-14 PROCEDURE — 99233 SBSQ HOSP IP/OBS HIGH 50: CPT

## 2022-05-14 RX ORDER — NALOXONE HYDROCHLORIDE 4 MG/.1ML
0.1 SPRAY NASAL
Refills: 0 | Status: DISCONTINUED | OUTPATIENT
Start: 2022-05-14 | End: 2022-05-16

## 2022-05-14 RX ORDER — ONDANSETRON 8 MG/1
4 TABLET, FILM COATED ORAL EVERY 6 HOURS
Refills: 0 | Status: DISCONTINUED | OUTPATIENT
Start: 2022-05-14 | End: 2022-05-15

## 2022-05-14 RX ORDER — ACETAMINOPHEN 500 MG
1000 TABLET ORAL EVERY 6 HOURS
Refills: 0 | Status: COMPLETED | OUTPATIENT
Start: 2022-05-14 | End: 2022-05-15

## 2022-05-14 RX ORDER — POTASSIUM CHLORIDE 20 MEQ
20 PACKET (EA) ORAL
Refills: 0 | Status: COMPLETED | OUTPATIENT
Start: 2022-05-14 | End: 2022-05-14

## 2022-05-14 RX ORDER — HYDROMORPHONE HYDROCHLORIDE 2 MG/ML
30 INJECTION INTRAMUSCULAR; INTRAVENOUS; SUBCUTANEOUS
Refills: 0 | Status: DISCONTINUED | OUTPATIENT
Start: 2022-05-14 | End: 2022-05-15

## 2022-05-14 RX ORDER — HYDROMORPHONE HYDROCHLORIDE 2 MG/ML
30 INJECTION INTRAMUSCULAR; INTRAVENOUS; SUBCUTANEOUS
Refills: 0 | Status: DISCONTINUED | OUTPATIENT
Start: 2022-05-14 | End: 2022-05-14

## 2022-05-14 RX ORDER — ACETAMINOPHEN 500 MG
650 TABLET ORAL EVERY 6 HOURS
Refills: 0 | Status: DISCONTINUED | OUTPATIENT
Start: 2022-05-14 | End: 2022-05-14

## 2022-05-14 RX ADMIN — Medication 50 MILLIGRAM(S): at 09:53

## 2022-05-14 RX ADMIN — Medication 75 MILLIGRAM(S): at 09:52

## 2022-05-14 RX ADMIN — CYCLOBENZAPRINE HYDROCHLORIDE 10 MILLIGRAM(S): 10 TABLET, FILM COATED ORAL at 05:21

## 2022-05-14 RX ADMIN — Medication 1 MILLIGRAM(S): at 08:44

## 2022-05-14 RX ADMIN — PANTOPRAZOLE SODIUM 40 MILLIGRAM(S): 20 TABLET, DELAYED RELEASE ORAL at 09:53

## 2022-05-14 RX ADMIN — Medication 650 MILLIGRAM(S): at 18:31

## 2022-05-14 RX ADMIN — LISINOPRIL 20 MILLIGRAM(S): 2.5 TABLET ORAL at 09:53

## 2022-05-14 RX ADMIN — Medication 20 MILLIEQUIVALENT(S): at 14:46

## 2022-05-14 RX ADMIN — HYDROMORPHONE HYDROCHLORIDE 30 MILLILITER(S): 2 INJECTION INTRAMUSCULAR; INTRAVENOUS; SUBCUTANEOUS at 22:00

## 2022-05-14 RX ADMIN — HYDROMORPHONE HYDROCHLORIDE 1 MILLIGRAM(S): 2 INJECTION INTRAMUSCULAR; INTRAVENOUS; SUBCUTANEOUS at 15:49

## 2022-05-14 RX ADMIN — HYDROMORPHONE HYDROCHLORIDE 1 MILLIGRAM(S): 2 INJECTION INTRAMUSCULAR; INTRAVENOUS; SUBCUTANEOUS at 12:20

## 2022-05-14 RX ADMIN — Medication 20 MILLIEQUIVALENT(S): at 16:58

## 2022-05-14 RX ADMIN — Medication 1 TABLET(S): at 22:55

## 2022-05-14 RX ADMIN — Medication 1 MILLIGRAM(S): at 09:53

## 2022-05-14 RX ADMIN — HYDROMORPHONE HYDROCHLORIDE 1 MILLIGRAM(S): 2 INJECTION INTRAMUSCULAR; INTRAVENOUS; SUBCUTANEOUS at 08:45

## 2022-05-14 RX ADMIN — CYCLOBENZAPRINE HYDROCHLORIDE 10 MILLIGRAM(S): 10 TABLET, FILM COATED ORAL at 14:48

## 2022-05-14 RX ADMIN — Medication 20 MILLIEQUIVALENT(S): at 18:31

## 2022-05-14 RX ADMIN — Medication 650 MILLIGRAM(S): at 09:56

## 2022-05-14 RX ADMIN — SENNA PLUS 2 TABLET(S): 8.6 TABLET ORAL at 21:27

## 2022-05-14 NOTE — PROGRESS NOTE ADULT - SUBJECTIVE AND OBJECTIVE BOX
POD#2. Pt seen resting in bed comfortably initially. Pt states she has incisional site pain not controlled with current PCA regimen and states she wants additional meds with the PCA to control pain. Pt has pain of the right buttock and posterior aspect of LLE to the left foot. She feels her lower extremities are weak. Pt voided on own without complications. She states since yesterday she has right lower abdominal pain characterized as spasm like. Pt denies nausea and vomiting.     PE  Gen appearance: Anxious and teary  Motor strength: 5/5 of b/l HF, quads, ant tibs, gastrocs, and EHL  Sensation: intact to light touch bilat  no calf tenderness bilat.   Incisional site: clean and dry dressing. Drain:45cc last shift per RN.  Abd: non-distended. Soft. Mild tenderness of the right lower abdominal region.     Plan  Afeb, BP: 102/60, HR: 120 eval per Hospitalist.   WBC: wnl, H/H: 9.1/27.8, potassium:3.3L replete, BUN:6L   Mobilize with physical therapy and encouraged incentive spirometer,  Continue PCA. Drain kept.   Right lower abdominal pain eval per Hospitalist.   Discussed case with Dr. Martinez.   POD#2. Pt seen resting in bed comfortably initially. Pt states she has incisional site pain not controlled with current PCA regimen and states she wants additional meds with the PCA to control pain. Pt has pain of the right buttock and posterior aspect of LLE to the left foot. She feels her lower extremities are weak. Pt voided on own without complications. She states since yesterday she has right lower abdominal pain characterized as spasm like. Pt denies nausea and vomiting.     PE  Gen appearance: Anxious and teary  Motor strength: 5/5 of b/l HF, quads, ant tibs, gastrocs, and EHL  Sensation: intact to light touch bilat  no calf tenderness bilat.   Incisional site: clean and dry dressing. Drain:45cc last shift per RN.  Abd: non-distended. Soft. Mild tenderness of the right lower abdominal region.     Plan  WBC: wnl, H/H: 9.1/27.8, potassium:3.3L replete, BUN:6L   Mobilize with physical therapy.  Continue PCA. Drain kept.   Right lower abdominal pain eval per Hospitalist.   Discussed case with Dr. Martinez.      Addendum: Discussed with Jade BOOTH Temp: 102.3- encouraged patient the use of incentive spirometer. Monitor temp. Continue mechanical DVT prophylaxis.  Surgical cultures negative. Tachycardia eval per Hospitalist. LSO seen at bedside.

## 2022-05-14 NOTE — PROGRESS NOTE ADULT - ASSESSMENT
46 female with  pmh of HTN, Anxiety, palpitations, Depression, GERD, Fibroids, anemia presented to the ER with worsening back pain. She has had chronic back pain X 20 years. Recently in march, was in [place] where all of a sudden she started having severe lower back pain with radiation down LLE with numbness and tingling down LLE. She underwent L5-S1 revision laminectomy with psf 5/12.     # S/P L5-S1 Revision Laminectomy POD 2  - With fever this morning  - Tylenol prn fever  - Add incentive spirometry  - Pain control   - Physical Therapy eval-CT with residual bulging of disc material which compresses ventral thecal sac.   -MRI as above, showing possible osteomyelitis, but less likely given lack of fever/drainage or erythema at incision site    # Post Operative Fever  - Complains of RLQ pain  - Check CT abd/pelvis  - History of appendectomy in the past  - Incentive spirometry, OOB   - Check Urine, Chest xray and blood cultures  - Monitor closely for fever    # Tacchycardia  - Secondary to fever  - Monitor    # Hypokalemia  - Replete  - Recheck labs and magnesium in am    # HTN:  -continue ace-i/bb with hold parameters.   -hold hctz.       #Macrocytic anemia:  -has folate deficiency  -supplementation ordered    # Anxiety/depression:  -effexor, increase prn ativan to tid.     # Palpitations:  - Metoprolol     # DVT px:  - SCDS

## 2022-05-14 NOTE — CHART NOTE - NSCHARTNOTEFT_GEN_A_CORE
Temp: 102.3. Encouraged incentive spirometer. Pt was seen again. Sleeping in bed comfortably. Continue mechanical DVT prophylaxis. No calf tenderness. Surgical cultures negative. US of b/l lower extremities ordered. Tachycardia eval per Hospitalist. Discussed case with Dr. Mratinez and Dr. Guerin. Temp: 102.3. Encouraged incentive spirometer. Pt was seen again. Sleeping in bed comfortably. Mobilzied with physical therapy. Continue mechanical DVT prophylaxis. No calf tenderness. Surgical cultures negative. US of b/l lower extremities ordered. Tachycardia eval per Hospitalist. LSO seen at bedside. Discussed case with Dr. Martinez and Dr. Guerin. Temp: 102.3. Encouraged incentive spirometer. Pt was seen again. Sleeping in bed comfortably. Mobilized with physical therapy. Continue mechanical DVT prophylaxis. No calf tenderness. Surgical cultures negative. US of b/l lower extremities ordered. Tachycardia eval per Hospitalist. LSO seen at bedside. Discussed case with Dr. Martinez and Dr. Guerin.

## 2022-05-14 NOTE — PROGRESS NOTE ADULT - SUBJECTIVE AND OBJECTIVE BOX
46 female with  pmh of HTN, Anxiety, palpitations, Depression, GERD, Fibroids, anemia presented to the ER with worsening back pain. She has had chronic back pain X 20 years. Recently in march, was in [place] where all of a sudden she started having severe lower back pain with radiation down LLE with numbness and tingling down LLE. No weakness. She underwent a lumbar laminectomy at [facility] on 4/4/2022. Stayed in the hospital for about 2- 3 days then was discharged home after an uncomplicated stay. She subsequently noticed worsening pain similar to the pain she had prior to surgery. She underwent outpt imaging which she states showed disc fragments affecting the S1 nerve. She is now admitted for further mx. Hospitalist service consulted for medical comanagement.  She underwent L5-S1 revision laminectomy with psf 5/12.     Patient seen this afternoon. With occasional cough which she states started after surgery. Has some right lower quadrant pain but denies nausea/vomiting. She reports continue back pain but stable. Tolerating minimal po. She also developed a fever of 102.3, now 100.3 after tylenol.    All other systems reviewed and found to be negative with the exception of what has been described above.    MEDICATIONS  (STANDING):  cyclobenzaprine 10 milliGRAM(s) Oral every 8 hours  folic acid 1 milliGRAM(s) Oral daily  HYDROmorphone PCA (1 mG/mL) 30 milliLiter(s) PCA Continuous PCA Continuous  lactated ringers. 1000 milliLiter(s) (75 mL/Hr) IV Continuous <Continuous>  lisinopril 20 milliGRAM(s) Oral daily  metoprolol succinate ER 50 milliGRAM(s) Oral daily  pantoprazole    Tablet 40 milliGRAM(s) Oral daily  potassium chloride    Tablet ER 20 milliEquivalent(s) Oral every 2 hours  senna 2 Tablet(s) Oral at bedtime  venlafaxine XR. 75 milliGRAM(s) Oral daily    MEDICATIONS  (PRN):  acetaminophen     Tablet .. 650 milliGRAM(s) Oral every 6 hours PRN Temp greater or equal to 38C (100.4F), Mild Pain (1 - 3)  bisacodyl 5 milliGRAM(s) Oral every 12 hours PRN Constipation  HYDROmorphone  Injectable 1 milliGRAM(s) IV Push every 3 hours PRN Severe Pain (7 - 10)  HYDROmorphone PCA (1 mG/mL) Rescue Clinician Bolus 0.5 milliGRAM(s) IV Push every 15 minutes PRN for Pain Scale GREATER THAN 6  LORazepam     Tablet 1 milliGRAM(s) Oral three times a day PRN Anxiety  magnesium hydroxide Suspension 30 milliLiter(s) Oral every 12 hours PRN Constipation  naloxone Injectable 0.1 milliGRAM(s) IV Push every 3 minutes PRN For ANY of the following changes in patient status:  A. RR LESS THAN 10 breaths per minute, B. Oxygen saturation LESS THAN 90%, C. Sedation score of 6  ondansetron Injectable 4 milliGRAM(s) IV Push every 6 hours PRN Nausea  ondansetron Injectable 4 milliGRAM(s) IV Push every 6 hours PRN Nausea and/or Vomiting  oxyCODONE    IR 5 milliGRAM(s) Oral every 4 hours PRN Mild Pain (1 - 3)  oxyCODONE    IR 10 milliGRAM(s) Oral every 4 hours PRN Moderate Pain (4 - 6)      VITALS:  T(F): 98.2 (05-14-22 @ 11:31), Max: 102.3 (05-14-22 @ 09:56)  HR: 136 (05-14-22 @ 09:56) (97 - 136)  BP: 118/85 (05-14-22 @ 08:40) (90/55 - 118/85)  RR: 16 (05-14-22 @ 08:40) (16 - 18)  SpO2: 100% (05-14-22 @ 08:40) (93% - 100%)  Wt(kg): --    I&O's Summary    14 May 2022 07:01  -  14 May 2022 13:21  --------------------------------------------------------  IN: 0 mL / OUT: 45 mL / NET: -45 mL      PHYSICAL EXAM:  Gen: Mild distress, febrile  HEENT:  pupils equal and reactive, EOMI, no oropharyngeal lesions, erythema, exudates, oral thrush  NECK:   supple, no carotid bruits, no palpable lymph nodes, no thyromegaly  CV:  +S1, +S2, tachycardic, no murmurs or rubs  RESP:   lungs clear to auscultation bilaterally, no wheezing, rales, rhonchi, good air entry bilaterally  BREAST:  not examined  GI:  abdomen soft, non-tender, non-distended, normal BS, no bruits, no abdominal masses, no palpable masses  RECTAL:  not examined  :  not examined  MSK:   normal muscle tone, no atrophy, no rigidity, no contractions  EXT:  no clubbing, no cyanosis, no edema, no calf pain, swelling or erythema  VASCULAR:  pulses equal and symmetric in the upper and lower extremities  NEURO:  AAOX3, no focal neurological deficits, follows all commands, able to move extremities spontaneously  SKIN: Back with wound vac    LABS:                            9.1    9.67  )-----------( 250      ( 14 May 2022 08:40 )             27.8     05-14    137  |  107  |  6<L>  ----------------------------<  115<H>  3.3<L>   |  20<L>  |  0.62    Ca    8.5      14 May 2022 08:40

## 2022-05-15 LAB
ANION GAP SERPL CALC-SCNC: 8 MMOL/L — SIGNIFICANT CHANGE UP (ref 5–17)
BASOPHILS # BLD AUTO: 0.04 K/UL — SIGNIFICANT CHANGE UP (ref 0–0.2)
BASOPHILS NFR BLD AUTO: 0.4 % — SIGNIFICANT CHANGE UP (ref 0–2)
BUN SERPL-MCNC: 5 MG/DL — LOW (ref 7–23)
CALCIUM SERPL-MCNC: 8.5 MG/DL — SIGNIFICANT CHANGE UP (ref 8.5–10.1)
CHLORIDE SERPL-SCNC: 107 MMOL/L — SIGNIFICANT CHANGE UP (ref 96–108)
CO2 SERPL-SCNC: 21 MMOL/L — LOW (ref 22–31)
CREAT SERPL-MCNC: 0.48 MG/DL — LOW (ref 0.5–1.3)
CULTURE RESULTS: SIGNIFICANT CHANGE UP
EGFR: 118 ML/MIN/1.73M2 — SIGNIFICANT CHANGE UP
EOSINOPHIL # BLD AUTO: 0.06 K/UL — SIGNIFICANT CHANGE UP (ref 0–0.5)
EOSINOPHIL NFR BLD AUTO: 0.7 % — SIGNIFICANT CHANGE UP (ref 0–6)
GLUCOSE SERPL-MCNC: 92 MG/DL — SIGNIFICANT CHANGE UP (ref 70–99)
HCT VFR BLD CALC: 24.9 % — LOW (ref 34.5–45)
HGB BLD-MCNC: 8.2 G/DL — LOW (ref 11.5–15.5)
IMM GRANULOCYTES NFR BLD AUTO: 0.5 % — SIGNIFICANT CHANGE UP (ref 0–1.5)
LYMPHOCYTES # BLD AUTO: 1.75 K/UL — SIGNIFICANT CHANGE UP (ref 1–3.3)
LYMPHOCYTES # BLD AUTO: 19.1 % — SIGNIFICANT CHANGE UP (ref 13–44)
MAGNESIUM SERPL-MCNC: 1.5 MG/DL — LOW (ref 1.6–2.6)
MCHC RBC-ENTMCNC: 32.9 GM/DL — SIGNIFICANT CHANGE UP (ref 32–36)
MCHC RBC-ENTMCNC: 34.3 PG — HIGH (ref 27–34)
MCV RBC AUTO: 104.2 FL — HIGH (ref 80–100)
MONOCYTES # BLD AUTO: 1.03 K/UL — HIGH (ref 0–0.9)
MONOCYTES NFR BLD AUTO: 11.3 % — SIGNIFICANT CHANGE UP (ref 2–14)
NEUTROPHILS # BLD AUTO: 6.22 K/UL — SIGNIFICANT CHANGE UP (ref 1.8–7.4)
NEUTROPHILS NFR BLD AUTO: 68 % — SIGNIFICANT CHANGE UP (ref 43–77)
PLATELET # BLD AUTO: 239 K/UL — SIGNIFICANT CHANGE UP (ref 150–400)
POTASSIUM SERPL-MCNC: 3.7 MMOL/L — SIGNIFICANT CHANGE UP (ref 3.5–5.3)
POTASSIUM SERPL-SCNC: 3.7 MMOL/L — SIGNIFICANT CHANGE UP (ref 3.5–5.3)
RBC # BLD: 2.39 M/UL — LOW (ref 3.8–5.2)
RBC # FLD: 14.2 % — SIGNIFICANT CHANGE UP (ref 10.3–14.5)
SODIUM SERPL-SCNC: 136 MMOL/L — SIGNIFICANT CHANGE UP (ref 135–145)
SPECIMEN SOURCE: SIGNIFICANT CHANGE UP
WBC # BLD: 9.15 K/UL — SIGNIFICANT CHANGE UP (ref 3.8–10.5)
WBC # FLD AUTO: 9.15 K/UL — SIGNIFICANT CHANGE UP (ref 3.8–10.5)

## 2022-05-15 PROCEDURE — 99233 SBSQ HOSP IP/OBS HIGH 50: CPT

## 2022-05-15 RX ORDER — HYDROMORPHONE HYDROCHLORIDE 2 MG/ML
30 INJECTION INTRAMUSCULAR; INTRAVENOUS; SUBCUTANEOUS
Refills: 0 | Status: DISCONTINUED | OUTPATIENT
Start: 2022-05-15 | End: 2022-05-15

## 2022-05-15 RX ORDER — NALOXONE HYDROCHLORIDE 4 MG/.1ML
0.1 SPRAY NASAL
Refills: 0 | Status: DISCONTINUED | OUTPATIENT
Start: 2022-05-15 | End: 2022-05-16

## 2022-05-15 RX ORDER — CEFTRIAXONE 500 MG/1
1000 INJECTION, POWDER, FOR SOLUTION INTRAMUSCULAR; INTRAVENOUS EVERY 24 HOURS
Refills: 0 | Status: DISCONTINUED | OUTPATIENT
Start: 2022-05-15 | End: 2022-05-16

## 2022-05-15 RX ORDER — ACETAMINOPHEN 500 MG
650 TABLET ORAL EVERY 6 HOURS
Refills: 0 | Status: DISCONTINUED | OUTPATIENT
Start: 2022-05-15 | End: 2022-05-16

## 2022-05-15 RX ORDER — OXYCODONE HYDROCHLORIDE 5 MG/1
10 TABLET ORAL EVERY 4 HOURS
Refills: 0 | Status: DISCONTINUED | OUTPATIENT
Start: 2022-05-15 | End: 2022-05-16

## 2022-05-15 RX ORDER — ONDANSETRON 8 MG/1
4 TABLET, FILM COATED ORAL EVERY 6 HOURS
Refills: 0 | Status: DISCONTINUED | OUTPATIENT
Start: 2022-05-15 | End: 2022-05-16

## 2022-05-15 RX ORDER — MAGNESIUM SULFATE 500 MG/ML
1 VIAL (ML) INJECTION ONCE
Refills: 0 | Status: COMPLETED | OUTPATIENT
Start: 2022-05-15 | End: 2022-05-15

## 2022-05-15 RX ORDER — OXYCODONE HYDROCHLORIDE 5 MG/1
5 TABLET ORAL EVERY 4 HOURS
Refills: 0 | Status: DISCONTINUED | OUTPATIENT
Start: 2022-05-15 | End: 2022-05-16

## 2022-05-15 RX ORDER — HYDROMORPHONE HYDROCHLORIDE 2 MG/ML
1 INJECTION INTRAMUSCULAR; INTRAVENOUS; SUBCUTANEOUS EVERY 4 HOURS
Refills: 0 | Status: DISCONTINUED | OUTPATIENT
Start: 2022-05-15 | End: 2022-05-16

## 2022-05-15 RX ORDER — CYCLOBENZAPRINE HYDROCHLORIDE 10 MG/1
10 TABLET, FILM COATED ORAL
Refills: 0 | Status: DISCONTINUED | OUTPATIENT
Start: 2022-05-15 | End: 2022-05-16

## 2022-05-15 RX ORDER — OXYCODONE HYDROCHLORIDE 5 MG/1
10 TABLET ORAL EVERY 12 HOURS
Refills: 0 | Status: DISCONTINUED | OUTPATIENT
Start: 2022-05-15 | End: 2022-05-16

## 2022-05-15 RX ADMIN — Medication 1 TABLET(S): at 09:24

## 2022-05-15 RX ADMIN — OXYCODONE HYDROCHLORIDE 10 MILLIGRAM(S): 5 TABLET ORAL at 14:41

## 2022-05-15 RX ADMIN — Medication 1 MILLIGRAM(S): at 21:02

## 2022-05-15 RX ADMIN — OXYCODONE HYDROCHLORIDE 10 MILLIGRAM(S): 5 TABLET ORAL at 22:48

## 2022-05-15 RX ADMIN — Medication 650 MILLIGRAM(S): at 14:07

## 2022-05-15 RX ADMIN — Medication 400 MILLIGRAM(S): at 01:02

## 2022-05-15 RX ADMIN — HYDROMORPHONE HYDROCHLORIDE 1 MILLIGRAM(S): 2 INJECTION INTRAMUSCULAR; INTRAVENOUS; SUBCUTANEOUS at 23:56

## 2022-05-15 RX ADMIN — HYDROMORPHONE HYDROCHLORIDE 30 MILLILITER(S): 2 INJECTION INTRAMUSCULAR; INTRAVENOUS; SUBCUTANEOUS at 02:07

## 2022-05-15 RX ADMIN — Medication 1 MILLIGRAM(S): at 09:23

## 2022-05-15 RX ADMIN — HYDROMORPHONE HYDROCHLORIDE 30 MILLILITER(S): 2 INJECTION INTRAMUSCULAR; INTRAVENOUS; SUBCUTANEOUS at 00:15

## 2022-05-15 RX ADMIN — CYCLOBENZAPRINE HYDROCHLORIDE 10 MILLIGRAM(S): 10 TABLET, FILM COATED ORAL at 20:10

## 2022-05-15 RX ADMIN — Medication 100 GRAM(S): at 13:17

## 2022-05-15 RX ADMIN — OXYCODONE HYDROCHLORIDE 10 MILLIGRAM(S): 5 TABLET ORAL at 21:59

## 2022-05-15 RX ADMIN — Medication 75 MILLIGRAM(S): at 09:23

## 2022-05-15 RX ADMIN — OXYCODONE HYDROCHLORIDE 10 MILLIGRAM(S): 5 TABLET ORAL at 20:11

## 2022-05-15 RX ADMIN — PANTOPRAZOLE SODIUM 40 MILLIGRAM(S): 20 TABLET, DELAYED RELEASE ORAL at 09:24

## 2022-05-15 RX ADMIN — SENNA PLUS 2 TABLET(S): 8.6 TABLET ORAL at 21:02

## 2022-05-15 RX ADMIN — CEFTRIAXONE 100 MILLIGRAM(S): 500 INJECTION, POWDER, FOR SOLUTION INTRAMUSCULAR; INTRAVENOUS at 16:01

## 2022-05-15 RX ADMIN — Medication 1000 MILLIGRAM(S): at 01:51

## 2022-05-15 RX ADMIN — OXYCODONE HYDROCHLORIDE 10 MILLIGRAM(S): 5 TABLET ORAL at 16:00

## 2022-05-15 RX ADMIN — OXYCODONE HYDROCHLORIDE 10 MILLIGRAM(S): 5 TABLET ORAL at 21:02

## 2022-05-15 RX ADMIN — Medication 650 MILLIGRAM(S): at 13:50

## 2022-05-15 RX ADMIN — HYDROMORPHONE HYDROCHLORIDE 30 MILLILITER(S): 2 INJECTION INTRAMUSCULAR; INTRAVENOUS; SUBCUTANEOUS at 01:37

## 2022-05-15 NOTE — PROGRESS NOTE ADULT - SUBJECTIVE AND OBJECTIVE BOX
POD#3. Pt seen resting in bed comfortably with  at bedside. Pt seen earlier ambulating with physical therapy with no difficulties. Pt states she has incisional pain. Pain radiates to posterior aspect of LLE to the left foot and right buttock pain unchanged. She denies numbness and weakness of b/l lower extremities. She has dysuria. Had a temp on 102.3 yesterday being treated for UTI. Pt denies chest pain, SOB, vertigo.     PE  Gen appearance: NAD  motor strength: 5/5 of b/l HF, quads, ant tibs, gastrocs, and EHL.  Sensation: intact to light touch bilat  No calf tenderness bilat---venodynes on.  Incisional site: dry seronsang drainage on dressing. Last drain: 80cc last shift 35cc--kept    Plan  Last temp 99.5, HR:104, BP:105/62. Eval per MED.  surgical cultures negative to date.   WBC: wnl, H/H:8.2/24.9. BUN:5, creatinine: 0.48 eval per MED.   She did not proceed with dopplers and states she was not comfortable with laying down and doing the study. Pt is aware of risks.   Mobilize with physical therapy and encouraged incentive spirometer.  DC PCA and transition to oral meds. Oxycontin 10mg BID, Oxycodone 5mg Q4H for mild pain, Oxycodone 10mg Q4H for moderate pain.

## 2022-05-15 NOTE — CHART NOTE - NSCHARTNOTEFT_GEN_A_CORE
Stefany RN called service. Patient is requesting Dilaudid injection as current medications (Oxycontin 10mg and Oxycodone) are not helping with the pain. Dilaudid 1mg Subcutaneous Q4H for severe breakthrough pain ordered. Discussed with Dr. Ayers.

## 2022-05-15 NOTE — CHART NOTE - NSCHARTNOTEFT_GEN_A_CORE
Dressing changed. Wound is clean and dry. No surrounding erythema or induration. Drain is sewn in and intact. Dressing changed. Wound is clean and dry. No surrounding erythema or induration. Drain is sewn in and intact---kept. Pt is anxious to go home and wants to go home today. Discussed with patient and  that patient needs to be afebrile prior to discharge. Discussed with patient that vitals and labs need to be stable prior to discharge. Discussed with patient that pain should also be managed with oral meds prior to discharge as the PCA will be discontinued today. Pt and  understood. All questions answered.

## 2022-05-15 NOTE — PROGRESS NOTE ADULT - ASSESSMENT
46 female with  pmh of HTN, Anxiety, palpitations, Depression, GERD, Fibroids, anemia presented to the ER with worsening back pain. She has had chronic back pain X 20 years. Recently in march, was in [place] where all of a sudden she started having severe lower back pain with radiation down LLE with numbness and tingling down LLE. She underwent L5-S1 revision laminectomy with psf 5/12.     # S/P L5-S1 Revision Laminectomy POD 2  - With fever this morning  - Tylenol prn fever  - Add incentive spirometry  - Pain control   - Physical Therapy eval-CT with residual bulging of disc material which compresses ventral thecal sac.   -MRI as above, showing possible osteomyelitis, but less likely given lack of fever/drainage or erythema at incision site  - FU recommendations from spine     # Post Operative Fever  - Complains of RLQ pain, likely uterine fibroids  - See CT above  - History of appendectomy in the past  - Incentive spirometry, OOB   - Urine suspicious for UTI, Continue bactrim  - FU urine culture  - Monitor closely for fever    # Tacchycardia  - Secondary to fever  - Monitor    # Hypokalemia  - Replete    # Hypomagnesia  - Mag Jose x 1 today    # HTN:  -continue ace-i/bb with hold parameters.   -hold hctz.     #Macrocytic anemia:  -has folate deficiency  -supplementation ordered    # Anxiety/depression:  -effexor,   -ativan     # Palpitations:  - Metoprolol     # DVT px:  - SCDS 46 female with  pmh of HTN, Anxiety, palpitations, Depression, GERD, Fibroids, anemia presented to the ER with worsening back pain. She has had chronic back pain X 20 years. Recently in march, was in [place] where all of a sudden she started having severe lower back pain with radiation down LLE with numbness and tingling down LLE. She underwent L5-S1 revision laminectomy with psf 5/12.     # S/P L5-S1 Revision Laminectomy POD 2  - With fever this morning  - Tylenol prn fever  - Add incentive spirometry  - Pain control   - Physical Therapy eval-CT with residual bulging of disc material which compresses ventral thecal sac.   -MRI as above, showing possible osteomyelitis, but less likely given lack of fever/drainage or erythema at incision site  - FU recommendations from spine     # Post Operative Fever  - Complains of RLQ pain, likely uterine fibroids  - See CT above  - History of appendectomy in the past  - Incentive spirometry, OOB   - Urine suspicious for UTI, Continue bactrim  - FU urine culture and blood cultures  - Monitor closely for fever    # Tacchycardia  - Secondary to fever  - Monitor    # Hypokalemia  - Replete    # Hypomagnesia  - Mag Jose x 1 today    # HTN:  -continue ace-i/bb with hold parameters.   -hold hctz.     #Macrocytic anemia:  -has folate deficiency  -supplementation ordered    # Anxiety/depression:  -effexor,   -ativan     # Palpitations:  - Metoprolol     # DVT px:  - SCDS 46 female with  pmh of HTN, Anxiety, palpitations, Depression, GERD, Fibroids, anemia presented to the ER with worsening back pain. She has had chronic back pain X 20 years. Recently in march, was in [place] where all of a sudden she started having severe lower back pain with radiation down LLE with numbness and tingling down LLE. She underwent L5-S1 revision laminectomy with psf 5/12.     # S/P L5-S1 Revision Laminectomy POD 2  - With fever this morning  - Tylenol prn fever  - Add incentive spirometry  - Pain control   - Physical Therapy eval-CT with residual bulging of disc material which compresses ventral thecal sac.   -MRI as above, showing possible osteomyelitis, but less likely given lack of fever/drainage or erythema at incision site  - FU recommendations from spine     # Post Operative Fever  - Complains of RLQ pain, likely uterine fibroids  - See CT above  - History of appendectomy in the past  - Incentive spirometry, OOB   - Urine suspicious for UTI  - Will DC bactrim, Start Rocephin Daily x 3 days  - FU urine culture and blood cultures  - Monitor closely for fever, if she continues to spike    would consider ID consult     # Tacchycardia  - Secondary to fever  - Monitor    # Hypokalemia  - Replete    # Hypomagnesia  - Mag Jose x 1 today    # HTN:  -continue ace-i/bb with hold parameters.   -hold hctz.     #Macrocytic anemia:  -has folate deficiency  -supplementation ordered    # Anxiety/depression:  -effexor,   -ativan     # Palpitations:  - Metoprolol     # DVT px:  - SCDS

## 2022-05-15 NOTE — PROGRESS NOTE ADULT - SUBJECTIVE AND OBJECTIVE BOX
46 female with  pmh of HTN, Anxiety, palpitations, Depression, GERD, Fibroids, anemia presented to the ER with worsening back pain. She has had chronic back pain X 20 years. Recently in march, was in [place] where all of a sudden she started having severe lower back pain with radiation down LLE with numbness and tingling down LLE. No weakness. She underwent a lumbar laminectomy at [facility] on 2022. Stayed in the hospital for about 2- 3 days then was discharged home after an uncomplicated stay. She subsequently noticed worsening pain similar to the pain she had prior to surgery. She underwent outpt imaging which she states showed disc fragments affecting the S1 nerve. She is now admitted for further mx. Hospitalist service consulted for medical comanagement.  She underwent L5-S1 revision laminectomy with psf .    Patient seen this am, low grade fever last night, none this morning. Pain better controlled. Less nausea, no vomiting.     Review of systems reviewed and found to be negative with the exception of what has been described above.    MEDICATIONS  (STANDING):  folic acid 1 milliGRAM(s) Oral daily  HYDROmorphone PCA (1 mG/mL) 30 milliLiter(s) PCA Continuous PCA Continuous  lactated ringers. 1000 milliLiter(s) (75 mL/Hr) IV Continuous <Continuous>  lisinopril 20 milliGRAM(s) Oral daily  magnesium sulfate  IVPB 1 Gram(s) IV Intermittent once  metoprolol succinate ER 50 milliGRAM(s) Oral daily  pantoprazole    Tablet 40 milliGRAM(s) Oral daily  senna 2 Tablet(s) Oral at bedtime  trimethoprim  160 mG/sulfamethoxazole 800 mG 1 Tablet(s) Oral two times a day  venlafaxine XR. 75 milliGRAM(s) Oral daily    MEDICATIONS  (PRN):  bisacodyl 5 milliGRAM(s) Oral every 12 hours PRN Constipation  magnesium hydroxide Suspension 30 milliLiter(s) Oral every 12 hours PRN Constipation  naloxone Injectable 0.1 milliGRAM(s) IV Push every 3 minutes PRN For ANY of the following changes in patient status:  A. RR LESS THAN 10 breaths per minute, B. Oxygen saturation LESS THAN 90%, C. Sedation score of 6  naloxone Injectable 0.1 milliGRAM(s) IV Push every 3 minutes PRN For ANY of the following changes in patient status:  A. RR LESS THAN 10 breaths per minute, B. Oxygen saturation LESS THAN 90%, C. Sedation score of 6  naloxone Injectable 0.1 milliGRAM(s) IV Push every 3 minutes PRN For ANY of the following changes in patient status:  A. RR LESS THAN 10 breaths per minute, B. Oxygen saturation LESS THAN 90%, C. Sedation score of 6  naloxone Injectable 0.1 milliGRAM(s) IV Push every 3 minutes PRN For ANY of the following changes in patient status:  A. RR LESS THAN 10 breaths per minute, B. Oxygen saturation LESS THAN 90%, C. Sedation score of 6  ondansetron Injectable 4 milliGRAM(s) IV Push every 6 hours PRN Nausea  ondansetron Injectable 4 milliGRAM(s) IV Push every 6 hours PRN Nausea  ondansetron Injectable 4 milliGRAM(s) IV Push every 6 hours PRN Nausea  ondansetron Injectable 4 milliGRAM(s) IV Push every 6 hours PRN Nausea  ondansetron Injectable 4 milliGRAM(s) IV Push every 6 hours PRN Nausea  ondansetron Injectable 4 milliGRAM(s) IV Push every 6 hours PRN Nausea and/or Vomiting      VITALS:  T(F): 99.5 (05-15-22 @ 08:12), Max: 100.5 (22 @ 18:20)  HR: 104 (05-15-22 @ 08:12) (104 - 117)  BP: 105/62 (05-15-22 @ 08:12) (98/61 - 105/81)  RR: 16 (05-15-22 @ 08:12) (16 - 18)  SpO2: 100% (05-15-22 @ 08:12) (98% - 100%)  Wt(kg): --    I&O's Summary    14 May 2022 07:01  -  15 May 2022 07:00  --------------------------------------------------------  IN: 0 mL / OUT: 80 mL / NET: -80 mL        CAPILLARY BLOOD GLUCOSE          PHYSICAL EXAM:  GEN: NAD  HEENT:  pupils equal and reactive, EOMI, no oropharyngeal lesions, erythema, exudates, oral thrush  NECK:   supple, no carotid bruits, no palpable lymph nodes, no thyromegaly  CV:  +S1, +S2, regular, no murmurs or rubs  RESP:   lungs clear to auscultation bilaterally, no wheezing, rales, rhonchi, good air entry bilaterally  BREAST:  not examined  GI:  abdomen soft, tender RLQ, no rebound or guarding  RECTAL:  not examined  :  not examined  BACK: + Vac  MSK:   normal muscle tone, no atrophy, no rigidity, no contractions  EXT:  no clubbing, no cyanosis, no edema, no calf pain, swelling or erythema  VASCULAR:  pulses equal and symmetric in the upper and lower extremities  NEURO:  AAOX3, no focal neurological deficits, follows all commands, able to move extremities spontaneously  SKIN:  no ulcers, lesions or rashes    LABS:                            8.2    9.15  )-----------( 239      ( 15 May 2022 09:20 )             24.9     05-15    136  |  107  |  5<L>  ----------------------------<  92  3.7   |  21<L>  |  0.48<L>    Ca    8.5      15 May 2022 09:20  Mg     1.5     05-15        Urinalysis Basic - ( 14 May 2022 16:00 )  Color: Anna / Appearance: very cloudy / S.015 / pH: x  Gluc: x / Ketone: Moderate  / Bili: Small / Urobili: 4   Blood: x / Protein: 15 / Nitrite: Negative   Leuk Esterase: Moderate / RBC: 3-5 /HPF / WBC 6-10   Sq Epi: x / Non Sq Epi: Many / Bacteria: Moderate      < from: CT Abdomen and Pelvis No Cont (22 @ 15:19) >    IMPRESSION:  No hydronephrosis or obstructive calculi. No bowel obstruction.    Status post pedicle fusion of L5-S1 with intravertebral disc graft   placement with drainage in place and postoperative changes.    < end of copied text >  < from: Xray Chest 1 View- PORTABLE-Routine (Xray Chest 1 View- PORTABLE-Routine .) (22 @ 15:08) >    IMPRESSION:   No radiographic evidence of active chest disease.    < end of copied text >

## 2022-05-16 ENCOUNTER — TRANSCRIPTION ENCOUNTER (OUTPATIENT)
Age: 47
End: 2022-05-16

## 2022-05-16 VITALS
RESPIRATION RATE: 18 BRPM | HEART RATE: 130 BPM | DIASTOLIC BLOOD PRESSURE: 91 MMHG | OXYGEN SATURATION: 97 % | TEMPERATURE: 100 F | SYSTOLIC BLOOD PRESSURE: 122 MMHG

## 2022-05-16 LAB
ANION GAP SERPL CALC-SCNC: 9 MMOL/L — SIGNIFICANT CHANGE UP (ref 5–17)
BASOPHILS # BLD AUTO: 0.04 K/UL — SIGNIFICANT CHANGE UP (ref 0–0.2)
BASOPHILS NFR BLD AUTO: 0.5 % — SIGNIFICANT CHANGE UP (ref 0–2)
BUN SERPL-MCNC: 2 MG/DL — LOW (ref 7–23)
CALCIUM SERPL-MCNC: 8.7 MG/DL — SIGNIFICANT CHANGE UP (ref 8.5–10.1)
CHLORIDE SERPL-SCNC: 104 MMOL/L — SIGNIFICANT CHANGE UP (ref 96–108)
CO2 SERPL-SCNC: 23 MMOL/L — SIGNIFICANT CHANGE UP (ref 22–31)
CREAT SERPL-MCNC: 0.45 MG/DL — LOW (ref 0.5–1.3)
EGFR: 120 ML/MIN/1.73M2 — SIGNIFICANT CHANGE UP
EOSINOPHIL # BLD AUTO: 0.06 K/UL — SIGNIFICANT CHANGE UP (ref 0–0.5)
EOSINOPHIL NFR BLD AUTO: 0.8 % — SIGNIFICANT CHANGE UP (ref 0–6)
GLUCOSE SERPL-MCNC: 98 MG/DL — SIGNIFICANT CHANGE UP (ref 70–99)
HCT VFR BLD CALC: 24.8 % — LOW (ref 34.5–45)
HGB BLD-MCNC: 8.2 G/DL — LOW (ref 11.5–15.5)
IMM GRANULOCYTES NFR BLD AUTO: 0.3 % — SIGNIFICANT CHANGE UP (ref 0–1.5)
LYMPHOCYTES # BLD AUTO: 1.08 K/UL — SIGNIFICANT CHANGE UP (ref 1–3.3)
LYMPHOCYTES # BLD AUTO: 14.6 % — SIGNIFICANT CHANGE UP (ref 13–44)
MCHC RBC-ENTMCNC: 33.1 GM/DL — SIGNIFICANT CHANGE UP (ref 32–36)
MCHC RBC-ENTMCNC: 33.9 PG — SIGNIFICANT CHANGE UP (ref 27–34)
MCV RBC AUTO: 102.5 FL — HIGH (ref 80–100)
MONOCYTES # BLD AUTO: 0.67 K/UL — SIGNIFICANT CHANGE UP (ref 0–0.9)
MONOCYTES NFR BLD AUTO: 9 % — SIGNIFICANT CHANGE UP (ref 2–14)
NEUTROPHILS # BLD AUTO: 5.55 K/UL — SIGNIFICANT CHANGE UP (ref 1.8–7.4)
NEUTROPHILS NFR BLD AUTO: 74.8 % — SIGNIFICANT CHANGE UP (ref 43–77)
PLATELET # BLD AUTO: 292 K/UL — SIGNIFICANT CHANGE UP (ref 150–400)
POTASSIUM SERPL-MCNC: 3.3 MMOL/L — LOW (ref 3.5–5.3)
POTASSIUM SERPL-SCNC: 3.3 MMOL/L — LOW (ref 3.5–5.3)
RBC # BLD: 2.42 M/UL — LOW (ref 3.8–5.2)
RBC # FLD: 13.7 % — SIGNIFICANT CHANGE UP (ref 10.3–14.5)
SODIUM SERPL-SCNC: 136 MMOL/L — SIGNIFICANT CHANGE UP (ref 135–145)
WBC # BLD: 7.42 K/UL — SIGNIFICANT CHANGE UP (ref 3.8–10.5)
WBC # FLD AUTO: 7.42 K/UL — SIGNIFICANT CHANGE UP (ref 3.8–10.5)

## 2022-05-16 PROCEDURE — 99233 SBSQ HOSP IP/OBS HIGH 50: CPT

## 2022-05-16 PROCEDURE — 93971 EXTREMITY STUDY: CPT | Mod: 26,RT

## 2022-05-16 RX ORDER — POTASSIUM CHLORIDE 20 MEQ
40 PACKET (EA) ORAL ONCE
Refills: 0 | Status: DISCONTINUED | OUTPATIENT
Start: 2022-05-16 | End: 2022-05-16

## 2022-05-16 RX ORDER — CYCLOBENZAPRINE HYDROCHLORIDE 10 MG/1
10 TABLET, FILM COATED ORAL THREE TIMES A DAY
Refills: 0 | Status: DISCONTINUED | OUTPATIENT
Start: 2022-05-16 | End: 2022-05-16

## 2022-05-16 RX ORDER — OXYCODONE HYDROCHLORIDE 5 MG/1
1 TABLET ORAL
Qty: 0 | Refills: 0 | DISCHARGE
Start: 2022-05-16

## 2022-05-16 RX ORDER — ACETAMINOPHEN 500 MG
1000 TABLET ORAL ONCE
Refills: 0 | Status: DISCONTINUED | OUTPATIENT
Start: 2022-05-16 | End: 2022-05-16

## 2022-05-16 RX ORDER — HYDROMORPHONE HYDROCHLORIDE 2 MG/ML
0.5 INJECTION INTRAMUSCULAR; INTRAVENOUS; SUBCUTANEOUS ONCE
Refills: 0 | Status: DISCONTINUED | OUTPATIENT
Start: 2022-05-16 | End: 2022-05-16

## 2022-05-16 RX ORDER — CYCLOBENZAPRINE HYDROCHLORIDE 10 MG/1
1 TABLET, FILM COATED ORAL
Qty: 0 | Refills: 0 | DISCHARGE
Start: 2022-05-16

## 2022-05-16 RX ORDER — HYDROMORPHONE HYDROCHLORIDE 2 MG/ML
1 INJECTION INTRAMUSCULAR; INTRAVENOUS; SUBCUTANEOUS
Qty: 0 | Refills: 0 | DISCHARGE

## 2022-05-16 RX ORDER — HYDROMORPHONE HYDROCHLORIDE 2 MG/ML
1 INJECTION INTRAMUSCULAR; INTRAVENOUS; SUBCUTANEOUS
Refills: 0 | Status: DISCONTINUED | OUTPATIENT
Start: 2022-05-16 | End: 2022-05-16

## 2022-05-16 RX ADMIN — OXYCODONE HYDROCHLORIDE 10 MILLIGRAM(S): 5 TABLET ORAL at 10:15

## 2022-05-16 RX ADMIN — Medication 650 MILLIGRAM(S): at 09:17

## 2022-05-16 RX ADMIN — OXYCODONE HYDROCHLORIDE 10 MILLIGRAM(S): 5 TABLET ORAL at 11:30

## 2022-05-16 RX ADMIN — HYDROMORPHONE HYDROCHLORIDE 1 MILLIGRAM(S): 2 INJECTION INTRAMUSCULAR; INTRAVENOUS; SUBCUTANEOUS at 05:12

## 2022-05-16 RX ADMIN — Medication 650 MILLIGRAM(S): at 10:13

## 2022-05-16 RX ADMIN — HYDROMORPHONE HYDROCHLORIDE 1 MILLIGRAM(S): 2 INJECTION INTRAMUSCULAR; INTRAVENOUS; SUBCUTANEOUS at 09:50

## 2022-05-16 RX ADMIN — Medication 1 MILLIGRAM(S): at 02:04

## 2022-05-16 RX ADMIN — PANTOPRAZOLE SODIUM 40 MILLIGRAM(S): 20 TABLET, DELAYED RELEASE ORAL at 09:17

## 2022-05-16 RX ADMIN — HYDROMORPHONE HYDROCHLORIDE 1 MILLIGRAM(S): 2 INJECTION INTRAMUSCULAR; INTRAVENOUS; SUBCUTANEOUS at 06:01

## 2022-05-16 RX ADMIN — OXYCODONE HYDROCHLORIDE 10 MILLIGRAM(S): 5 TABLET ORAL at 02:56

## 2022-05-16 RX ADMIN — HYDROMORPHONE HYDROCHLORIDE 1 MILLIGRAM(S): 2 INJECTION INTRAMUSCULAR; INTRAVENOUS; SUBCUTANEOUS at 01:58

## 2022-05-16 RX ADMIN — HYDROMORPHONE HYDROCHLORIDE 0.5 MILLIGRAM(S): 2 INJECTION INTRAMUSCULAR; INTRAVENOUS; SUBCUTANEOUS at 12:04

## 2022-05-16 RX ADMIN — Medication 650 MILLIGRAM(S): at 00:02

## 2022-05-16 RX ADMIN — CYCLOBENZAPRINE HYDROCHLORIDE 10 MILLIGRAM(S): 10 TABLET, FILM COATED ORAL at 12:35

## 2022-05-16 RX ADMIN — OXYCODONE HYDROCHLORIDE 10 MILLIGRAM(S): 5 TABLET ORAL at 06:03

## 2022-05-16 RX ADMIN — Medication 1 MILLIGRAM(S): at 13:50

## 2022-05-16 RX ADMIN — OXYCODONE HYDROCHLORIDE 10 MILLIGRAM(S): 5 TABLET ORAL at 10:24

## 2022-05-16 RX ADMIN — OXYCODONE HYDROCHLORIDE 10 MILLIGRAM(S): 5 TABLET ORAL at 06:52

## 2022-05-16 RX ADMIN — OXYCODONE HYDROCHLORIDE 10 MILLIGRAM(S): 5 TABLET ORAL at 02:01

## 2022-05-16 RX ADMIN — Medication 650 MILLIGRAM(S): at 01:58

## 2022-05-16 RX ADMIN — Medication 1 MILLIGRAM(S): at 09:17

## 2022-05-16 RX ADMIN — HYDROMORPHONE HYDROCHLORIDE 1 MILLIGRAM(S): 2 INJECTION INTRAMUSCULAR; INTRAVENOUS; SUBCUTANEOUS at 09:18

## 2022-05-16 RX ADMIN — CYCLOBENZAPRINE HYDROCHLORIDE 10 MILLIGRAM(S): 10 TABLET, FILM COATED ORAL at 02:01

## 2022-05-16 RX ADMIN — Medication 75 MILLIGRAM(S): at 09:17

## 2022-05-16 RX ADMIN — OXYCODONE HYDROCHLORIDE 10 MILLIGRAM(S): 5 TABLET ORAL at 12:16

## 2022-05-16 RX ADMIN — LISINOPRIL 20 MILLIGRAM(S): 2.5 TABLET ORAL at 09:17

## 2022-05-16 RX ADMIN — Medication 50 MILLIGRAM(S): at 09:17

## 2022-05-16 NOTE — PROGRESS NOTE ADULT - REASON FOR ADMISSION
Low back pain

## 2022-05-16 NOTE — CHART NOTE - NSCHARTNOTEFT_GEN_A_CORE
Spoke to LEOBARDO Hui. Patient has pain with no relief with current medications. Stat dose of Dilaudid 0.5mg Subcutaneous ordered. Dilaudid 1mg changed to Q3H for breakthrough severe pain.  Spoke to US tech and states patient refuses to do LLE doppler because of pain. Tech was told patient will do Ultrasound once pain is tolerable. Discussed case with Dr. Soriano.

## 2022-05-16 NOTE — PROGRESS NOTE ADULT - PROVIDER SPECIALTY LIST ADULT
Hospitalist
Hospitalist
Orthopedics
Orthopedics
Hospitalist
Orthopedics
Hospitalist
Orthopedics
Hospitalist

## 2022-05-16 NOTE — DISCHARGE NOTE PROVIDER - HOSPITAL COURSE
5/11/22 Presented to ED for severe lumbar pain and LE pain MRI L spine with and W/o contrast, CT L spine with L5 S1 disc height loss recurrent herniation.     5/12/22: VSS, afeb, labs stable, Dopplers neg, CXR/EKG wnl, CT/MRI done   cleared for OR. She had severe pain. Neuro intact. Options reviewed. Kept NPO  Pt s/p L5/S1 Revision lami/ diskectomy, TLIF   Drain sewn in  PCA, morales, brace was ordered.  5/13/22:   POD#1. Pt was seen resting in bed. Pt had incisional site soreness. She had pain of the posterior aspect of LLE to the left foot and right buttock pain which was present pre-op and unchanged. Pt had numbness of the toes of the left foot present pre-op and unchanged. Morales discontinued earlier this morning. She voided on own. Used PCA frequently, she stated PCA helps somewhat, but still had pain. She expressed she was on higher dose of Dilaudid with her previous surgery and requested changing Dilaudid dosage.  Motor strength: 5/5 of b/l HF, quads, ant tibs, gastrocs, and EHL  Sensation: Hyposensitive of the left greater toe. Otherwise intact to light touch of b/l lower extremities   no calf tenderness bilat  Dressing clean and intact. Drain: overnight 175cc per Nurse.   Afebrile, HR:96, BP:113/64  WBC: wnl, H/H:10.2/30.4  PCA adjustment per Anesthesiology team.   Discontinued Valium.  Drain kept.   Brace ordered.    and mom at the bedside. X-rays were shown and surgery was reviewed.   No growth from Intraop cultures.  5/14/22: POD#2  Pt was seen resting in bed comfortably initially. Pt stated she had incisional site pain not controlled with current PCA regimen and stated she wanted additional meds with the PCA to control pain. Pt had pain of the right buttock and posterior aspect of LLE to the left foot. She felt her lower extremities were weak. Pt voided on own without complications. She states she had right lower abdominal pain characterized as spasm like. Pt denied nausea and vomiting.   Motor strength: 5/5 of b/l HF, quads, ant tibs, gastrocs, and EHL  Sensation: intact to light touch bilat  no calf tenderness bilat.   Incisional site: clean and dry dressing. Drain:45cc last shift per RN.  Abd: non-distended. Soft. Mild tenderness of the right lower abdominal region.   Afeb, BP: 102/60, HR: 120 eval per Hospitalist.   WBC: wnl, H/H: 9.1/27.8, potassium:3.3L replete, BUN:6L   Mobilize with physical therapy and encouraged incentive spirometer,  Continued PCA. Drain kept.   Right lower abdominal pain eval per Hospitalist. Ct abd was ordered   Temp 102.3. No calf tenderness. Seen again. Resting comfortably. Surgical cultures negative Encouraged incentive spirometer. STAT dopplers ordered.  Patient with negative cxr and CT abd with enlarged fibroid uterus. UA consistent with UTI.   Pt refused to do dopplers.    5/15/22: POD#3. Pt was seen resting in bed comfortably with  at bedside. Pt was seen earlier ambulating with physical therapy with no difficulties. Pt stated she had incisional pain. Pain radiates to posterior aspect of LLE to the left foot and right buttock pain unchanged. She denied numbness and weakness of b/l lower extremities. She had dysuria. Had a temp on 102.3 the day before, she was started treatment for UTI. Pt denied chest pain, SOB, vertigo.   Gen appearance: NAD  motor strength: 5/5 of b/l HF, quads, ant tibs, gastrocs, and EHL.  Sensation: intact to light touch bilat  No calf tenderness bilat---venodynes on.  Incisional site: dry seronsang drainage on dressing. Last drain: 80cc last shift 35cc--kept  Last temp 99.5, HR:104, BP:105/62. Eval per MED.  surgical cultures negative to date.   WBC: wnl, H/H:8.2/24.9. BUN:5, creatinine: 0.48 eval per MED.   She did not proceed with dopplers and stated she was not comfortable with laying down and doing the study. Pt is aware of risks.   Mobilized with physical therapy and encouraged incentive spirometer.  DC PCA and transition to oral meds. Oxycontin 10mg BID, Oxycodone 5mg Q4H for mild pain, Oxycodone 10mg Q4H for moderate pain.  Dressing changed. Wound looked clean and dry. No surrounding erythema or induration. Drain sewn in and intact. Kept. Discussed in details with her and her  that she needs to be afebrile prior to discharge. Pt was anxious to go home. Discussed with patient labs should be stable, vitals should be stable. Pain should be managed with oral meds prior to discharge.    5/16/22: POD#4. Pt seen sitting on bed c/o incisional site pain, pain radiating to posterior aspect of LLE to the left and right buttock pain. She denies numbness and weakness of b/l lower extremities She states current medications are not helping with her pain. Voided on her own.   Motor strength: 5/5 of b/l HF, quads, ant tibs, gastrocs, and EHL  Sensation: intact to light touch.  Left calf tenderness on exam today. No calf tenderness of the RLE.  Incisional site: clean and dry dressing. Drain: 0cc  Tmax:100.8, Last temp:98.4 HR:116  drain removed. Dressing changed.   Doppler of b/l lower extremities ordered. Discussed with patient the importance of exam to r/o DVT. Unable to obtain LLE-pt cannot tolerate exam due to pain.   Mobilize with physical therapy and encouraged incentive spirometer.   LSO brace when out of bed.   IV tylenol 1000mg TID.  Seen by. Dr. Martinez. Her main issue is pain control. Similar difficulties prior to the surgery  She is currently on Oxycodone, Oxycontin, FLexeril,   Will send with this regimen and Celebrex  Cont Bactrim for the UTI               5/11/22 Presented to ED for severe lumbar pain and LE pain MRI L spine with and W/o contrast, CT L spine with L5 S1 disc height loss recurrent herniation.     5/12/22: VSS, afeb, labs stable, Dopplers neg, CXR/EKG wnl, CT/MRI done   cleared for OR. She had severe pain. Neuro intact. Options reviewed. Kept NPO  Pt s/p L5/S1 Revision lami/ diskectomy, TLIF   Drain sewn in  PCA, morales, brace was ordered.  5/13/22:   POD#1. Pt was seen resting in bed. Pt had incisional site soreness. She had pain of the posterior aspect of LLE to the left foot and right buttock pain which was present pre-op and unchanged. Pt had numbness of the toes of the left foot present pre-op and unchanged. Morales discontinued earlier this morning. She voided on own. Used PCA frequently, she stated PCA helps somewhat, but still had pain. She expressed she was on higher dose of Dilaudid with her previous surgery and requested changing Dilaudid dosage.  Motor strength: 5/5 of b/l HF, quads, ant tibs, gastrocs, and EHL  Sensation: Hyposensitive of the left greater toe. Otherwise intact to light touch of b/l lower extremities   no calf tenderness bilat  Dressing clean and intact. Drain: overnight 175cc per Nurse.   Afebrile, HR:96, BP:113/64  WBC: wnl, H/H:10.2/30.4  PCA adjustment per Anesthesiology team.   Discontinued Valium.  Drain kept.   Brace ordered.    and mom at the bedside. X-rays were shown and surgery was reviewed.   No growth from Intraop cultures.  5/14/22: POD#2  Pt was seen resting in bed comfortably initially. Pt stated she had incisional site pain not controlled with current PCA regimen and stated she wanted additional meds with the PCA to control pain. Pt had pain of the right buttock and posterior aspect of LLE to the left foot. She felt her lower extremities were weak. Pt voided on own without complications. She states she had right lower abdominal pain characterized as spasm like. Pt denied nausea and vomiting.   Motor strength: 5/5 of b/l HF, quads, ant tibs, gastrocs, and EHL  Sensation: intact to light touch bilat  no calf tenderness bilat.   Incisional site: clean and dry dressing. Drain:45cc last shift per RN.  Abd: non-distended. Soft. Mild tenderness of the right lower abdominal region.   Afeb, BP: 102/60, HR: 120 eval per Hospitalist.   WBC: wnl, H/H: 9.1/27.8, potassium:3.3L replete, BUN:6L   Mobilize with physical therapy and encouraged incentive spirometer,  Continued PCA. Drain kept.   Right lower abdominal pain eval per Hospitalist. Ct abd was ordered   Temp 102.3. No calf tenderness. Seen again. Resting comfortably. Surgical cultures negative Encouraged incentive spirometer. STAT dopplers ordered.  Patient with negative cxr and CT abd with enlarged fibroid uterus. UA consistent with UTI.   Pt refused to do dopplers.    5/15/22: POD#3. Pt was seen resting in bed comfortably with  at bedside. Pt was seen earlier ambulating with physical therapy with no difficulties. Pt stated she had incisional pain. Pain radiates to posterior aspect of LLE to the left foot and right buttock pain unchanged. She denied numbness and weakness of b/l lower extremities. She had dysuria. Had a temp on 102.3 the day before, she was started treatment for UTI. Pt denied chest pain, SOB, vertigo.   Gen appearance: NAD  motor strength: 5/5 of b/l HF, quads, ant tibs, gastrocs, and EHL.  Sensation: intact to light touch bilat  No calf tenderness bilat---venodynes on.  Incisional site: dry seronsang drainage on dressing. Last drain: 80cc last shift 35cc--kept  Last temp 99.5, HR:104, BP:105/62. Eval per MED.  surgical cultures negative to date.   WBC: wnl, H/H:8.2/24.9. BUN:5, creatinine: 0.48 eval per MED.   She did not proceed with dopplers and stated she was not comfortable with laying down and doing the study. Pt is aware of risks.   Mobilized with physical therapy and encouraged incentive spirometer.  DC PCA and transition to oral meds. Oxycontin 10mg BID, Oxycodone 5mg Q4H for mild pain, Oxycodone 10mg Q4H for moderate pain.  Dressing changed. Wound looked clean and dry. No surrounding erythema or induration. Drain sewn in and intact. Kept. Discussed in details with her and her  that she needs to be afebrile prior to discharge. Pt was anxious to go home. Discussed with patient labs should be stable, vitals should be stable. Pain should be managed with oral meds prior to discharge.    5/16/22: POD#4. Pt seen sitting on bed c/o incisional site pain, pain radiating to posterior aspect of LLE to the left and right buttock pain. She denies numbness and weakness of b/l lower extremities She states current medications are not helping with her pain. Voided on her own.   Motor strength: 5/5 of b/l HF, quads, ant tibs, gastrocs, and EHL  Sensation: intact to light touch.  Had LLE calf tenderness in the morning. No calf tenderness of the RLE.  Incisional site: clean and dry dressing. Drain: 0cc  Tmax:100.8, Last temp:98.4 HR:116  drain removed. Dressing changed.   Doppler of b/l lower extremities ordered. Discussed with patient the importance of exam to r/o DVT. Unable to obtain LLE-pt cannot tolerate exam due to pain of the back per US tech. RLE US negative  Mobilize with physical therapy and encouraged incentive spirometer.   LSO brace when out of bed.   IV tylenol 1000mg TID was ordered.  Seen by. Dr. Martinez. Motor and neuro intact. Non-tender of the calfs and LE. Her main issue is pain control. Similar difficulties prior to the surgery  She is currently on Oxycodone, Oxycontin, FLexeril,   Will send with this regimen and Celebrex  Cont Bactrim for the UTI

## 2022-05-16 NOTE — PROGRESS NOTE ADULT - SUBJECTIVE AND OBJECTIVE BOX
Pt lying in bed   at bedside  No new LE issues  No issues with the wound  No fever currently, VSS  Wound with clean dressing  Ambulated independently    5/5 strength in the legs. sens in tact legs  Non tender in the legs or calf    I showed the pt and her  the intra op xrays again    Plan  S/P L5/S1  lami fusion  Her main issue is pain control  We had similar difficulties prior to the surgery  She is currently on Oxycodone, Oxycontin, FLexeril,   Will send with this regimen and Celebrex  States the Lyrica and Neurontin did not work  Cont Bactrim for the UTI  They understand that they will need to see a Pain management specialist upon DC to discuss further pain med options as well as help weran off the meds  We will continue help with the post op pain meds for the short term after DC  All questions answered  Pt understands and is agreeable to the plan  DC home today  Discussed wound care and activities.  Will F/U in 1 week

## 2022-05-16 NOTE — CHART NOTE - NSCHARTNOTEFT_GEN_A_CORE
Pt seen again to discuss medication adjustment. Pt resting comfortably and allowed for removal of drain. Drain was removed without complications. New dressing applied. Pt expressed pain with removal of tape of the dressing. IV Tylenol 1000mg ordered. Discussed case with Dr. Soriano. Pt seen again to discuss medication adjustment. Pt resting comfortably and allowed for removal of drain. Drain was removed without complications. New dressing applied. Pt expressed pain with removal of tape of the dressing. IV Tylenol 1000mg ordered. Discussed case with Dr. Soriano.    Addendum: Wound clean and dry. No surrounding erythema or induration.

## 2022-05-16 NOTE — DISCHARGE NOTE PROVIDER - CARE PROVIDER_API CALL
Chidi Martniez)  Orthopaedic Surgery  [address] [place] [address]  Phone: [phone]  Fax: [phone]  Follow Up Time: 1 week   awake/alert/oriented to person, place, time/situation

## 2022-05-16 NOTE — CHART NOTE - NSCHARTNOTEFT_GEN_A_CORE
Spoke to Yoly (US tech). Pt did not get LLE US done because of pain. Patient will do US once pain is tolerable. Re-ordered LLE doppler and Tech will attempt again.

## 2022-05-16 NOTE — DISCHARGE NOTE NURSING/CASE MANAGEMENT/SOCIAL WORK - NSDCPEFALRISK_GEN_ALL_CORE
For information on Fall & Injury Prevention, visit: [email or web address] OR  [email or web address] OR  https://www.cdc.gov/steadi/patient.html

## 2022-05-16 NOTE — DISCHARGE NOTE NURSING/CASE MANAGEMENT/SOCIAL WORK - PATIENT PORTAL LINK FT
You can access the FollowMyHealth Patient Portal offered by [facility] by registering at the following website: [email or web address]. By joining [facility]’s FollowMyHealth portal, you will also be able to view your health information using other applications (apps) compatible with our system.

## 2022-05-16 NOTE — PROGRESS NOTE ADULT - SUBJECTIVE AND OBJECTIVE BOX
46 female with  pmh of HTN, Anxiety, palpitations, Depression, GERD, Fibroids, anemia presented to the ER with worsening back pain. She has had chronic back pain X 20 years. Recently in march, was in [place] where all of a sudden she started having severe lower back pain with radiation down LLE with numbness and tingling down LLE. No weakness. She underwent a lumbar laminectomy at [facility] on 4/4/2022. Stayed in the hospital for about 2- 3 days then was discharged home after an uncomplicated stay. She subsequently noticed worsening pain similar to the pain she had prior to surgery. She underwent outpt imaging which she states showed disc fragments affecting the S1 nerve. She is now admitted for further mx. Hospitalist service consulted for medical comanagement.  She underwent L5-S1 revision laminectomy with psf 5/12.    5/16: Patient seen this am at bedside, crying, states she couldn't walk with PT, although she was seen walking back into room, still having intermittert temps, enocurage incentive spirometry.  Pain better controlled. Less nausea, no vomiting.     Review of systems reviewed and found to be negative with the exception of what has been described above.    MEDICATIONS  (STANDING):  acetaminophen   IVPB .. 1000 milliGRAM(s) IV Intermittent once  folic acid 1 milliGRAM(s) Oral daily  lactated ringers. 1000 milliLiter(s) (75 mL/Hr) IV Continuous <Continuous>  lisinopril 20 milliGRAM(s) Oral daily  metoprolol succinate ER 50 milliGRAM(s) Oral daily  oxyCODONE  ER Tablet 10 milliGRAM(s) Oral every 12 hours  pantoprazole    Tablet 40 milliGRAM(s) Oral daily  senna 2 Tablet(s) Oral at bedtime  venlafaxine XR. 75 milliGRAM(s) Oral daily    MEDICATIONS  (PRN):  bisacodyl 5 milliGRAM(s) Oral every 12 hours PRN Constipation  cyclobenzaprine 10 milliGRAM(s) Oral three times a day PRN Muscle Spasm  HYDROmorphone  Injectable 1 milliGRAM(s) SubCutaneous every 3 hours PRN Severe Pain (7 - 10)  LORazepam     Tablet 1 milliGRAM(s) Oral three times a day PRN Anxiety  magnesium hydroxide Suspension 30 milliLiter(s) Oral every 12 hours PRN Constipation  naloxone Injectable 0.1 milliGRAM(s) IV Push every 3 minutes PRN For ANY of the following changes in patient status:  A. RR LESS THAN 10 breaths per minute, B. Oxygen saturation LESS THAN 90%, C. Sedation score of 6  naloxone Injectable 0.1 milliGRAM(s) IV Push every 3 minutes PRN For ANY of the following changes in patient status:  A. RR LESS THAN 10 breaths per minute, B. Oxygen saturation LESS THAN 90%, C. Sedation score of 6  naloxone Injectable 0.1 milliGRAM(s) IV Push every 3 minutes PRN For ANY of the following changes in patient status:  A. RR LESS THAN 10 breaths per minute, B. Oxygen saturation LESS THAN 90%, C. Sedation score of 6  naloxone Injectable 0.1 milliGRAM(s) IV Push every 3 minutes PRN For ANY of the following changes in patient status:  A. RR LESS THAN 10 breaths per minute, B. Oxygen saturation LESS THAN 90%, C. Sedation score of 6  ondansetron Injectable 4 milliGRAM(s) IV Push every 6 hours PRN Nausea  oxyCODONE    IR 5 milliGRAM(s) Oral every 4 hours PRN Mild Pain (1 - 3)  oxyCODONE    IR 10 milliGRAM(s) Oral every 4 hours PRN Moderate Pain (4 - 6)    Vital Signs Last 24 Hrs  T(C): 37.9 (05-16-22 @ 08:45), Max: 38.2 (05-15-22 @ 20:27)  T(F): 100.3 (05-16-22 @ 08:45), Max: 100.8 (05-15-22 @ 20:27)  HR: 130 (05-16-22 @ 08:45) (105 - 130)  BP: 122/91 (05-16-22 @ 08:45) (117/66 - 132/80)  BP(mean): --  RR: 18 (05-16-22 @ 08:45) (16 - 18)  SpO2: 97% (05-16-22 @ 08:45) (93% - 100%)      PHYSICAL EXAM:    GENERAL: Comfortable, no acute distress   HEAD:  Normocephalic, atraumatic  EYES: EOMI, PERRLA  HEENT: Moist mucous membranes  NECK: Supple, No JVD  NERVOUS SYSTEM:  Alert & Oriented X3, Motor Strength 5/5 B/L upper and lower extremities  CHEST/LUNG: Clear to auscultation bilaterally  HEART: Regular rate and rhythm  ABDOMEN: Soft, non tender, Nondistended, Bowel sounds present  GENITOURINARY: Voiding, no palpable bladder  EXTREMITIES:   No clubbing, cyanosis, or edema  MUSCULOSKELETAL-+ low back pain      LABS:                        8.2    7.42  )-----------( 292      ( 16 May 2022 09:12 )             24.8       05-16    136  |  104  |  2<L>  ----------------------------<  98  3.3<L>   |  23  |  0.45<L>    Ca    8.7      16 May 2022 09:12  Mg     1.5     05-15      < from: CT Abdomen and Pelvis No Cont (05.14.22 @ 15:19) >    IMPRESSION:  No hydronephrosis or obstructive calculi. No bowel obstruction.    Status post pedicle fusion of L5-S1 with intravertebral disc graft   placement with drainage in place and postoperative changes.    < end of copied text >  < from: Xray Chest 1 View- PORTABLE-Routine (Xray Chest 1 View- PORTABLE-Routine .) (05.14.22 @ 15:08) >    IMPRESSION:   No radiographic evidence of active chest disease.    46 female with  above pmh  a/w:   # S/P L5-S1 Revision Laminectomy POD 3-  - Pain control   - Physical Therapy eval-CT with residual bulging of disc material which compresses ventral thecal sac.   -MRI as above, showing possible osteomyelitis,   - FU recommendations from spine     # Post Operative Fever  - History of appendectomy in the past  - Incentive spirometry, OOB   -U/A: neg, no pyuria  - cont Rocephin Daily x 3 days  - urine cultur: Neg,  blood cultures: NGTD  - Monitor closely for fever, if she continues to spike    would consider ID consult   -tylenol, incentive olga    # Tachycardia  - Secondary to fever  - Monitor    # Hypokalemia  - Replete    # Hypomagnesia  - repleted    # HTN:  -continue ace-i/bb with hold parameters.   -hold hctz.     #Macrocytic anemia:  -has folate deficiency  -supplementation ordered    # Anxiety/depression:  -effexor,   -ativan     # Palpitations:  - Metoprolol     # DVT px:  - SCDS    dispo: home when fever resolves

## 2022-05-16 NOTE — DISCHARGE NOTE PROVIDER - NSDCMRMEDTOKEN_GEN_ALL_CORE_FT
cyclobenzaprine 10 mg oral tablet: 1 tab(s) orally 3 times a day, As needed, Muscle Spasm  Effexor XR 75 mg oral capsule, extended release: 1 cap(s) orally once a day  lisinopril-hydrochlorothiazide 10 mg-12.5 mg oral tablet: 1 tab(s) orally once a day  LORazepam 1 mg oral tablet: 1 tab(s) orally once a day, As Needed  metoprolol succinate 50 mg oral tablet, extended release: 1 tab(s) orally once a day  Neosporin topical ointment: Apply topically to affected area to incision site as needed  omeprazole 40 mg oral delayed release capsule: 1 cap(s) orally once a day  ondansetron 4 mg oral tablet: 1 tab(s) orally every 8 hours, As Needed  oxyCODONE 10 mg oral tablet: 1 tab(s) orally every 4 hours, As needed, Moderate Pain (4 - 6)  oxyCODONE 10 mg oral tablet, extended release: 1 tab(s) orally every 12 hours

## 2022-05-16 NOTE — PROGRESS NOTE ADULT - SUBJECTIVE AND OBJECTIVE BOX
POD#4. Pt seen sitting on bed c/o incisional site pain, pain radiating to posterior aspect of LLE to the left and right buttock pain. She denies numbness and weakness of b/l lower extremities She states current medications are not helping with her pain. Voided on her own.     PE  Gen appearance: NAD  Motor strength: 5/5 of b/l HF, quads, ant tibs, gastrocs, and EHL  Sensation: intact to light touch.  Left calf tenderness on exam today. No calf tenderness of the RLE.  Incisional site: clean and dry dressing. Drain: 0cc    Plan  Tmax:100.8, Last temp:98.4 HR:116  Labs pending.   Attempted to remove dressing and drain today. Pt states she would like to hold off until she gets her Dilaudid injection.  Doppler of b/l lower extremities ordered. Discussed with patient the importance of exam to r/o DVT.   Mobilize with physical therapy and encouraged incentive spirometer.   LSO brace when out of bed.   IV tylenol 1000mg TID.  Discussed case with Dr. Soriano.

## 2022-05-17 LAB
CULTURE RESULTS: SIGNIFICANT CHANGE UP
SPECIMEN SOURCE: SIGNIFICANT CHANGE UP

## 2022-05-19 LAB
CULTURE RESULTS: SIGNIFICANT CHANGE UP
SPECIMEN SOURCE: SIGNIFICANT CHANGE UP

## 2022-05-24 DIAGNOSIS — E83.42 HYPOMAGNESEMIA: ICD-10-CM

## 2022-05-24 DIAGNOSIS — K21.9 GASTRO-ESOPHAGEAL REFLUX DISEASE WITHOUT ESOPHAGITIS: ICD-10-CM

## 2022-05-24 DIAGNOSIS — F41.9 ANXIETY DISORDER, UNSPECIFIED: ICD-10-CM
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
## 2022-05-24 DIAGNOSIS — I10 ESSENTIAL (PRIMARY) HYPERTENSION: ICD-10-CM

## 2022-05-24 DIAGNOSIS — R50.82 POSTPROCEDURAL FEVER: ICD-10-CM

## 2022-05-24 DIAGNOSIS — M51.17 INTERVERTEBRAL DISC DISORDERS WITH RADICULOPATHY, LUMBOSACRAL REGION: ICD-10-CM

## 2022-05-24 DIAGNOSIS — N39.0 URINARY TRACT INFECTION, SITE NOT SPECIFIED: ICD-10-CM

## 2022-05-24 DIAGNOSIS — R00.0 TACHYCARDIA, UNSPECIFIED: ICD-10-CM

## 2022-05-24 DIAGNOSIS — F32.A DEPRESSION, UNSPECIFIED: ICD-10-CM

## 2022-05-24 DIAGNOSIS — M48.07 SPINAL STENOSIS, LUMBOSACRAL REGION: ICD-10-CM

## 2022-05-24 DIAGNOSIS — E87.6 HYPOKALEMIA: ICD-10-CM

## 2022-05-24 DIAGNOSIS — D64.9 ANEMIA, UNSPECIFIED: ICD-10-CM
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
## 2022-07-28 NOTE — DISCHARGE NOTE PROVIDER - EXTENDED VTE YES NO FOR MLM ENOXAPARIN
Treatment Site: LVR face (mostly nose and perialar)
Consent: Written consent obtained, risks reviewed including but not limited to crusting, scabbing, blistering, scarring, darker or lighter pigmentary change, and/or incomplete removal.
Pre-Procedure Text: After consent was obtained and the procedure was explained, all persons present in the exam room put on their protective eyewear. Cold gel was applied to the treatment areas.
Detail Level: Zone
Procedural Text: The treatment areas were then treated as noted above.
Post-Care Instructions: I reviewed with the patient in detail post-care instructions. Patient is to apply vaseline with a q-tip to all crusted areas, and avoid picking at any scabs. Pt should stay away from the sun and wear sun protection until fully healed.
Energy In Mj/Cm2: 110
Spot Size: 40
Post-Procedure Text: Following the treatment, hydrocortisone and broad spectrum sunscreen was applied to the treatment areas.  Post-care instructions were discussed.  *Cooling was set to 12 degrees.\\n* Pt tolerated tx well and veins responded well.
Spot Depth: 1.0
External Cooling Fan Speed: 9
Number Of Passes: 1
,

## 2024-12-25 PROBLEM — F10.90 ALCOHOL USE: Status: ACTIVE | Noted: 2021-01-13

## 2025-02-14 ENCOUNTER — APPOINTMENT (OUTPATIENT)
Age: 50
End: 2025-02-14

## 2025-02-14 VITALS
HEART RATE: 83 BPM | WEIGHT: 124 LBS | SYSTOLIC BLOOD PRESSURE: 96 MMHG | HEIGHT: 63 IN | DIASTOLIC BLOOD PRESSURE: 66 MMHG | BODY MASS INDEX: 21.97 KG/M2

## 2025-02-14 PROCEDURE — 99204 OFFICE O/P NEW MOD 45 MIN: CPT

## 2025-02-14 RX ORDER — BUPRENORPHINE HYDROCHLORIDE 2 MG/1
2 TABLET SUBLINGUAL
Refills: 0 | Status: ACTIVE | COMMUNITY

## 2025-02-14 RX ORDER — SEMAGLUTIDE 2.4 MG/.75ML
2.4 INJECTION, SOLUTION SUBCUTANEOUS
Refills: 0 | Status: ACTIVE | COMMUNITY

## 2025-02-14 RX ORDER — LISINOPRIL AND HYDROCHLOROTHIAZIDE TABLETS 10; 12.5 MG/1; MG/1
10-12.5 TABLET ORAL
Refills: 0 | Status: ACTIVE | COMMUNITY

## 2025-02-14 RX ORDER — VENLAFAXINE 75 MG/1
75 TABLET ORAL
Refills: 0 | Status: ACTIVE | COMMUNITY

## 2025-02-14 RX ORDER — QUETIAPINE FUMARATE 200 MG/1
200 TABLET ORAL
Refills: 0 | Status: ACTIVE | COMMUNITY

## 2025-02-14 RX ORDER — CLONIDINE 0.1 MG/24H
0.1 PATCH, EXTENDED RELEASE TRANSDERMAL
Refills: 0 | Status: ACTIVE | COMMUNITY

## 2025-02-14 RX ORDER — METOPROLOL TARTRATE 50 MG/1
50 TABLET ORAL
Refills: 0 | Status: ACTIVE | COMMUNITY

## 2025-02-14 RX ORDER — DIAZEPAM 5 MG/1
5 TABLET ORAL
Refills: 0 | Status: ACTIVE | COMMUNITY

## 2025-02-14 RX ORDER — GABAPENTIN 600 MG/1
600 TABLET, COATED ORAL
Refills: 0 | Status: ACTIVE | COMMUNITY

## 2025-02-21 ENCOUNTER — APPOINTMENT (OUTPATIENT)
Age: 50
End: 2025-02-21
Payer: COMMERCIAL

## 2025-02-21 VITALS
WEIGHT: 123 LBS | HEIGHT: 63 IN | SYSTOLIC BLOOD PRESSURE: 115 MMHG | DIASTOLIC BLOOD PRESSURE: 77 MMHG | HEART RATE: 85 BPM | BODY MASS INDEX: 21.79 KG/M2

## 2025-02-21 DIAGNOSIS — N17.9 ACUTE KIDNEY FAILURE, UNSPECIFIED: ICD-10-CM

## 2025-02-21 DIAGNOSIS — I10 ESSENTIAL (PRIMARY) HYPERTENSION: ICD-10-CM

## 2025-02-21 DIAGNOSIS — D64.9 ANEMIA, UNSPECIFIED: ICD-10-CM

## 2025-02-21 LAB
ANION GAP SERPL CALC-SCNC: 10 MMOL/L
APPEARANCE: CLEAR
BASOPHILS # BLD AUTO: 0.08 K/UL
BASOPHILS NFR BLD AUTO: 1.1 %
BILIRUBIN URINE: NEGATIVE
BLOOD URINE: NEGATIVE
BUN SERPL-MCNC: 11 MG/DL
CALCIUM SERPL-MCNC: 10.1 MG/DL
CHLORIDE SERPL-SCNC: 102 MMOL/L
CO2 SERPL-SCNC: 25 MMOL/L
COLOR: NORMAL
CREAT SERPL-MCNC: 1.3 MG/DL
CREAT SPEC-SCNC: 210 MG/DL
CREAT/PROT UR: 0.1 RATIO
EGFR: 50 ML/MIN/1.73M2
EOSINOPHIL # BLD AUTO: 0.09 K/UL
EOSINOPHIL NFR BLD AUTO: 1.3 %
GLUCOSE QUALITATIVE U: NEGATIVE MG/DL
GLUCOSE SERPL-MCNC: 89 MG/DL
HCT VFR BLD CALC: 30.9 %
HGB BLD-MCNC: 10 G/DL
IMM GRANULOCYTES NFR BLD AUTO: 0.3 %
KETONES URINE: NEGATIVE MG/DL
LEUKOCYTE ESTERASE URINE: NEGATIVE
LYMPHOCYTES # BLD AUTO: 1.86 K/UL
LYMPHOCYTES NFR BLD AUTO: 26.4 %
MAN DIFF?: NORMAL
MCHC RBC-ENTMCNC: 28.8 PG
MCHC RBC-ENTMCNC: 32.4 G/DL
MCV RBC AUTO: 89 FL
MONOCYTES # BLD AUTO: 0.35 K/UL
MONOCYTES NFR BLD AUTO: 5 %
NEUTROPHILS # BLD AUTO: 4.65 K/UL
NEUTROPHILS NFR BLD AUTO: 65.9 %
NITRITE URINE: NEGATIVE
PH URINE: 7
PLATELET # BLD AUTO: 365 K/UL
POTASSIUM SERPL-SCNC: 4.4 MMOL/L
PROT UR-MCNC: 16 MG/DL
PROTEIN URINE: NEGATIVE MG/DL
RBC # BLD: 3.47 M/UL
RBC # FLD: 12.6 %
SODIUM SERPL-SCNC: 137 MMOL/L
SPECIFIC GRAVITY URINE: 1.02
UROBILINOGEN URINE: 0.2 MG/DL
WBC # FLD AUTO: 7.05 K/UL

## 2025-02-21 PROCEDURE — 99214 OFFICE O/P EST MOD 30 MIN: CPT

## 2025-03-20 ENCOUNTER — LABORATORY RESULT (OUTPATIENT)
Age: 50
End: 2025-03-20

## 2025-04-09 ENCOUNTER — APPOINTMENT (OUTPATIENT)
Age: 50
End: 2025-04-09
Payer: COMMERCIAL

## 2025-04-09 VITALS
HEART RATE: 86 BPM | HEIGHT: 63 IN | BODY MASS INDEX: 20.73 KG/M2 | SYSTOLIC BLOOD PRESSURE: 96 MMHG | WEIGHT: 117 LBS | DIASTOLIC BLOOD PRESSURE: 72 MMHG

## 2025-04-09 DIAGNOSIS — N17.9 ACUTE KIDNEY FAILURE, UNSPECIFIED: ICD-10-CM

## 2025-04-09 DIAGNOSIS — I10 ESSENTIAL (PRIMARY) HYPERTENSION: ICD-10-CM

## 2025-04-09 PROCEDURE — 99214 OFFICE O/P EST MOD 30 MIN: CPT
